# Patient Record
Sex: MALE | Race: WHITE | Employment: FULL TIME | URBAN - METROPOLITAN AREA
[De-identification: names, ages, dates, MRNs, and addresses within clinical notes are randomized per-mention and may not be internally consistent; named-entity substitution may affect disease eponyms.]

---

## 2018-06-11 ENCOUNTER — HOSPITAL ENCOUNTER (EMERGENCY)
Age: 43
Discharge: HOME | End: 2018-06-11
Attending: FAMILY MEDICINE
Payer: COMMERCIAL

## 2018-06-11 VITALS
BODY MASS INDEX: 28.14 KG/M2 | OXYGEN SATURATION: 100 % | TEMPERATURE: 98.9 F | SYSTOLIC BLOOD PRESSURE: 146 MMHG | WEIGHT: 190 LBS | HEART RATE: 84 BPM | RESPIRATION RATE: 14 BRPM | HEIGHT: 69 IN | DIASTOLIC BLOOD PRESSURE: 86 MMHG

## 2018-06-11 DIAGNOSIS — L23.7 POISON IVY DERMATITIS: Primary | ICD-10-CM

## 2018-06-11 PROCEDURE — 99282 EMERGENCY DEPT VISIT SF MDM: CPT

## 2018-06-11 PROCEDURE — 6360000002 HC RX W HCPCS

## 2018-06-11 RX ORDER — METHYLPREDNISOLONE 4 MG/1
TABLET ORAL
Qty: 1 KIT | Refills: 0 | Status: SHIPPED | OUTPATIENT
Start: 2018-06-11 | End: 2018-06-17

## 2018-06-11 RX ORDER — METHYLPREDNISOLONE SODIUM SUCCINATE 40 MG/ML
INJECTION, POWDER, LYOPHILIZED, FOR SOLUTION INTRAMUSCULAR; INTRAVENOUS
Status: COMPLETED
Start: 2018-06-11 | End: 2018-06-11

## 2018-06-11 RX ORDER — METHYLPREDNISOLONE ACETATE 80 MG/ML
80 INJECTION, SUSPENSION INTRA-ARTICULAR; INTRALESIONAL; INTRAMUSCULAR; SOFT TISSUE ONCE
Status: DISCONTINUED | OUTPATIENT
Start: 2018-06-11 | End: 2018-06-11 | Stop reason: HOSPADM

## 2018-06-11 RX ORDER — CLOBETASOL PROPIONATE 0.5 MG/G
OINTMENT TOPICAL
Qty: 30 G | Refills: 0 | Status: SHIPPED | OUTPATIENT
Start: 2018-06-11

## 2018-06-11 RX ADMIN — METHYLPREDNISOLONE SODIUM SUCCINATE 80 MG: 40 INJECTION, POWDER, FOR SOLUTION INTRAMUSCULAR; INTRAVENOUS at 15:13

## 2018-06-11 ASSESSMENT — PAIN DESCRIPTION - LOCATION: LOCATION: ARM

## 2018-06-11 ASSESSMENT — ENCOUNTER SYMPTOMS
ABDOMINAL PAIN: 0
THROAT SWELLING: 0
PERI-ORBITAL EDEMA: 0
WHEEZING: 0
HOARSE VOICE: 0
NAUSEA: 0
VOMITING: 0
SORE THROAT: 0
SHORTNESS OF BREATH: 0
DIARRHEA: 0

## 2018-06-11 ASSESSMENT — PAIN DESCRIPTION - FREQUENCY: FREQUENCY: CONTINUOUS

## 2018-06-11 ASSESSMENT — PAIN DESCRIPTION - ORIENTATION: ORIENTATION: LEFT

## 2018-06-11 ASSESSMENT — PAIN SCALES - GENERAL: PAINLEVEL_OUTOF10: 2

## 2018-06-11 ASSESSMENT — PAIN DESCRIPTION - PAIN TYPE: TYPE: ACUTE PAIN

## 2018-06-11 ASSESSMENT — PAIN DESCRIPTION - DESCRIPTORS: DESCRIPTORS: DISCOMFORT

## 2023-01-07 ENCOUNTER — APPOINTMENT (OUTPATIENT)
Dept: GENERAL RADIOLOGY | Age: 48
End: 2023-01-07
Payer: COMMERCIAL

## 2023-01-07 ENCOUNTER — HOSPITAL ENCOUNTER (INPATIENT)
Age: 48
LOS: 7 days | Discharge: HOME | End: 2023-01-14
Attending: STUDENT IN AN ORGANIZED HEALTH CARE EDUCATION/TRAINING PROGRAM | Admitting: INTERNAL MEDICINE
Payer: COMMERCIAL

## 2023-01-07 ENCOUNTER — APPOINTMENT (OUTPATIENT)
Dept: CT IMAGING | Age: 48
End: 2023-01-07
Payer: COMMERCIAL

## 2023-01-07 DIAGNOSIS — J90 PLEURAL EFFUSION ON LEFT: ICD-10-CM

## 2023-01-07 DIAGNOSIS — G89.18 ACUTE POST-OPERATIVE PAIN: ICD-10-CM

## 2023-01-07 DIAGNOSIS — J86.9 EMPYEMA (HCC): ICD-10-CM

## 2023-01-07 DIAGNOSIS — R07.9 ACUTE CHEST PAIN: Primary | ICD-10-CM

## 2023-01-07 LAB
ALBUMIN SERPL-MCNC: 3.8 G/DL (ref 3.5–5.2)
ALP BLD-CCNC: 96 U/L (ref 40–129)
ALT SERPL-CCNC: 13 U/L (ref 0–40)
ANION GAP SERPL CALCULATED.3IONS-SCNC: 14 MMOL/L (ref 7–16)
AST SERPL-CCNC: 10 U/L (ref 0–39)
BASOPHILS ABSOLUTE: 0.06 E9/L (ref 0–0.2)
BASOPHILS RELATIVE PERCENT: 0.5 % (ref 0–2)
BILIRUB SERPL-MCNC: 0.6 MG/DL (ref 0–1.2)
BUN BLDV-MCNC: 16 MG/DL (ref 6–20)
CALCIUM SERPL-MCNC: 9.2 MG/DL (ref 8.6–10.2)
CHLORIDE BLD-SCNC: 103 MMOL/L (ref 98–107)
CO2: 22 MMOL/L (ref 22–29)
CREAT SERPL-MCNC: 0.8 MG/DL (ref 0.7–1.2)
EOSINOPHILS ABSOLUTE: 0.07 E9/L (ref 0.05–0.5)
EOSINOPHILS RELATIVE PERCENT: 0.6 % (ref 0–6)
GFR SERPL CREATININE-BSD FRML MDRD: >60 ML/MIN/1.73
GLUCOSE BLD-MCNC: 98 MG/DL (ref 74–99)
HCT VFR BLD CALC: 39 % (ref 37–54)
HEMOGLOBIN: 13.2 G/DL (ref 12.5–16.5)
IMMATURE GRANULOCYTES #: 0.04 E9/L
IMMATURE GRANULOCYTES %: 0.4 % (ref 0–5)
INFLUENZA A: NOT DETECTED
INFLUENZA B: NOT DETECTED
LYMPHOCYTES ABSOLUTE: 0.69 E9/L (ref 1.5–4)
LYMPHOCYTES RELATIVE PERCENT: 6.1 % (ref 20–42)
MCH RBC QN AUTO: 28.6 PG (ref 26–35)
MCHC RBC AUTO-ENTMCNC: 33.8 % (ref 32–34.5)
MCV RBC AUTO: 84.4 FL (ref 80–99.9)
MONOCYTES ABSOLUTE: 0.92 E9/L (ref 0.1–0.95)
MONOCYTES RELATIVE PERCENT: 8.2 % (ref 2–12)
NEUTROPHILS ABSOLUTE: 9.45 E9/L (ref 1.8–7.3)
NEUTROPHILS RELATIVE PERCENT: 84.2 % (ref 43–80)
PDW BLD-RTO: 12.6 FL (ref 11.5–15)
PLATELET # BLD: 471 E9/L (ref 130–450)
PMV BLD AUTO: 10.2 FL (ref 7–12)
POTASSIUM REFLEX MAGNESIUM: 4.3 MMOL/L (ref 3.5–5)
PRO-BNP: 15 PG/ML (ref 0–125)
PRO-BNP: 24 PG/ML (ref 0–125)
RBC # BLD: 4.62 E12/L (ref 3.8–5.8)
REASON FOR REJECTION: NORMAL
REASON FOR REJECTION: NORMAL
REJECTED TEST: NORMAL
REJECTED TEST: NORMAL
SARS-COV-2 RNA, RT PCR: NOT DETECTED
SODIUM BLD-SCNC: 139 MMOL/L (ref 132–146)
TOTAL PROTEIN: 7.5 G/DL (ref 6.4–8.3)
TROPONIN, HIGH SENSITIVITY: <6 NG/L (ref 0–11)
TROPONIN, HIGH SENSITIVITY: <6 NG/L (ref 0–11)
WBC # BLD: 11.2 E9/L (ref 4.5–11.5)

## 2023-01-07 PROCEDURE — 85025 COMPLETE CBC W/AUTO DIFF WBC: CPT

## 2023-01-07 PROCEDURE — 71275 CT ANGIOGRAPHY CHEST: CPT

## 2023-01-07 PROCEDURE — 2580000003 HC RX 258: Performed by: STUDENT IN AN ORGANIZED HEALTH CARE EDUCATION/TRAINING PROGRAM

## 2023-01-07 PROCEDURE — 71045 X-RAY EXAM CHEST 1 VIEW: CPT

## 2023-01-07 PROCEDURE — 36415 COLL VENOUS BLD VENIPUNCTURE: CPT

## 2023-01-07 PROCEDURE — 80053 COMPREHEN METABOLIC PANEL: CPT

## 2023-01-07 PROCEDURE — 84484 ASSAY OF TROPONIN QUANT: CPT

## 2023-01-07 PROCEDURE — 6360000004 HC RX CONTRAST MEDICATION: Performed by: RADIOLOGY

## 2023-01-07 PROCEDURE — 96361 HYDRATE IV INFUSION ADD-ON: CPT

## 2023-01-07 PROCEDURE — 83880 ASSAY OF NATRIURETIC PEPTIDE: CPT

## 2023-01-07 PROCEDURE — 96360 HYDRATION IV INFUSION INIT: CPT

## 2023-01-07 PROCEDURE — 99285 EMERGENCY DEPT VISIT HI MDM: CPT

## 2023-01-07 PROCEDURE — 6370000000 HC RX 637 (ALT 250 FOR IP): Performed by: STUDENT IN AN ORGANIZED HEALTH CARE EDUCATION/TRAINING PROGRAM

## 2023-01-07 PROCEDURE — 93005 ELECTROCARDIOGRAM TRACING: CPT | Performed by: STUDENT IN AN ORGANIZED HEALTH CARE EDUCATION/TRAINING PROGRAM

## 2023-01-07 PROCEDURE — 1200000000 HC SEMI PRIVATE

## 2023-01-07 PROCEDURE — 87636 SARSCOV2 & INF A&B AMP PRB: CPT

## 2023-01-07 RX ORDER — ACETAMINOPHEN 325 MG/1
650 TABLET ORAL EVERY 6 HOURS PRN
Status: DISCONTINUED | OUTPATIENT
Start: 2023-01-07 | End: 2023-01-10

## 2023-01-07 RX ORDER — ONDANSETRON 2 MG/ML
4 INJECTION INTRAMUSCULAR; INTRAVENOUS EVERY 6 HOURS PRN
Status: DISCONTINUED | OUTPATIENT
Start: 2023-01-07 | End: 2023-01-10

## 2023-01-07 RX ORDER — BUPROPION HYDROCHLORIDE 100 MG/1
200 TABLET ORAL EVERY MORNING
Status: DISCONTINUED | OUTPATIENT
Start: 2023-01-08 | End: 2023-01-09

## 2023-01-07 RX ORDER — ONDANSETRON 4 MG/1
4 TABLET, ORALLY DISINTEGRATING ORAL EVERY 8 HOURS PRN
Status: DISCONTINUED | OUTPATIENT
Start: 2023-01-07 | End: 2023-01-10

## 2023-01-07 RX ORDER — ENOXAPARIN SODIUM 100 MG/ML
40 INJECTION SUBCUTANEOUS DAILY
Status: DISCONTINUED | OUTPATIENT
Start: 2023-01-08 | End: 2023-01-10

## 2023-01-07 RX ORDER — SODIUM CHLORIDE 9 MG/ML
INJECTION, SOLUTION INTRAVENOUS PRN
Status: DISCONTINUED | OUTPATIENT
Start: 2023-01-07 | End: 2023-01-10

## 2023-01-07 RX ORDER — 0.9 % SODIUM CHLORIDE 0.9 %
1000 INTRAVENOUS SOLUTION INTRAVENOUS ONCE
Status: COMPLETED | OUTPATIENT
Start: 2023-01-07 | End: 2023-01-07

## 2023-01-07 RX ORDER — ACETAMINOPHEN 650 MG/1
650 SUPPOSITORY RECTAL EVERY 6 HOURS PRN
Status: DISCONTINUED | OUTPATIENT
Start: 2023-01-07 | End: 2023-01-10

## 2023-01-07 RX ORDER — SODIUM CHLORIDE 0.9 % (FLUSH) 0.9 %
10 SYRINGE (ML) INJECTION PRN
Status: DISCONTINUED | OUTPATIENT
Start: 2023-01-07 | End: 2023-01-10

## 2023-01-07 RX ORDER — OXYCODONE HYDROCHLORIDE 5 MG/1
5 TABLET ORAL EVERY 4 HOURS PRN
Status: DISCONTINUED | OUTPATIENT
Start: 2023-01-07 | End: 2023-01-09

## 2023-01-07 RX ORDER — OXYCODONE HYDROCHLORIDE AND ACETAMINOPHEN 5; 325 MG/1; MG/1
1 TABLET ORAL ONCE
Status: COMPLETED | OUTPATIENT
Start: 2023-01-07 | End: 2023-01-07

## 2023-01-07 RX ORDER — QUETIAPINE FUMARATE 25 MG/1
100 TABLET, FILM COATED ORAL EVERY EVENING
Status: DISCONTINUED | OUTPATIENT
Start: 2023-01-07 | End: 2023-01-14 | Stop reason: HOSPADM

## 2023-01-07 RX ORDER — SODIUM CHLORIDE 0.9 % (FLUSH) 0.9 %
10 SYRINGE (ML) INJECTION EVERY 12 HOURS SCHEDULED
Status: DISCONTINUED | OUTPATIENT
Start: 2023-01-07 | End: 2023-01-10

## 2023-01-07 RX ADMIN — OXYCODONE AND ACETAMINOPHEN 1 TABLET: 5; 325 TABLET ORAL at 15:59

## 2023-01-07 RX ADMIN — IOPAMIDOL 75 ML: 755 INJECTION, SOLUTION INTRAVENOUS at 14:00

## 2023-01-07 RX ADMIN — SODIUM CHLORIDE 1000 ML: 9 INJECTION, SOLUTION INTRAVENOUS at 10:44

## 2023-01-07 ASSESSMENT — PAIN SCALES - GENERAL
PAINLEVEL_OUTOF10: 10
PAINLEVEL_OUTOF10: 10

## 2023-01-07 ASSESSMENT — PAIN DESCRIPTION - PAIN TYPE: TYPE: ACUTE PAIN

## 2023-01-07 ASSESSMENT — PAIN DESCRIPTION - LOCATION: LOCATION: CHEST

## 2023-01-07 ASSESSMENT — LIFESTYLE VARIABLES
HOW MANY STANDARD DRINKS CONTAINING ALCOHOL DO YOU HAVE ON A TYPICAL DAY: PATIENT DOES NOT DRINK
HOW OFTEN DO YOU HAVE A DRINK CONTAINING ALCOHOL: NEVER

## 2023-01-07 ASSESSMENT — PAIN DESCRIPTION - FREQUENCY: FREQUENCY: CONTINUOUS

## 2023-01-07 ASSESSMENT — PAIN DESCRIPTION - ORIENTATION: ORIENTATION: LEFT

## 2023-01-07 ASSESSMENT — PAIN - FUNCTIONAL ASSESSMENT: PAIN_FUNCTIONAL_ASSESSMENT: 0-10

## 2023-01-07 ASSESSMENT — PAIN DESCRIPTION - ONSET: ONSET: ON-GOING

## 2023-01-07 ASSESSMENT — PAIN DESCRIPTION - DESCRIPTORS: DESCRIPTORS: STABBING

## 2023-01-07 NOTE — ED PROVIDER NOTES
[address] ENCOUNTER        Pt Name: Lucy Monroe  MRN: [id number]  [place] 1975  Date of evaluation: 1/7/2023  Provider: Dottie Mckeon DO  PCP: None  Note Started: 10:29 AM EST 1/7/23    CHIEF COMPLAINT       Chief Complaint   Patient presents with    Chest Pain     ONGOING SICK X1 MONTH, ONGOING BACK PAIN AND YESTERDAY EVERYTIME TAKES DEEP BREATH GETS PAIN ON LEFT LUNG       HISTORY OF PRESENT ILLNESS: 1 or more Elements   History From: Patient    Limitations to history : None    Lucy Monroe is a 50 y.o. male past medical history of bipolar. Patient presents with chief complaint of cough, congestion as well as chest pain. Patient states that symptoms initially began 1 month ago. Patient notes that he has had intermittent episodes of cough congestion as well as low-grade fevers. Patient states that cough is productive of clear sputum. Patient also endorses that over the last few days he has had left-sided chest pain. He describes the pain as a sharp aching sensation currently rates pain 10 out of 10. States that pain is worsened with deep breathing he denies any relieving factors. Patient denies any similar episodes in the past.  Patient denies any nausea, vomit, abdominal pain, constipation, diarrhea, headache, lightheadedness, numbness or tingling. Nursing Notes were all reviewed and agreed with or any disagreements were addressed in the HPI. REVIEW OF SYSTEMS :      Positives and Pertinent negatives as per HPI. SURGICAL HISTORY     Past Surgical History:   Procedure Laterality Date    SHOULDER SURGERY         CURRENTMEDICATIONS       Previous Medications    BUPROPION (WELLBUTRIN) 100 MG TABLET    Take 200 mg by mouth every morning. CLOBETASOL (TEMOVATE) 0.05 % OINTMENT    Apply topically 2 times daily. QUETIAPINE (SEROQUEL) 100 MG TABLET    Take 100 mg by mouth every evening.        ALLERGIES     Sulfa antibiotics    FAMILYHISTORY     History reviewed. No pertinent family history. SOCIAL HISTORY       Social History     Tobacco Use    Smoking status: Never   Substance Use Topics    Alcohol use: No    Drug use: No       SCREENINGS        Damaris Coma Scale  Eye Opening: Spontaneous  Best Verbal Response: Oriented  Best Motor Response: Obeys commands  [place] Coma Scale Score: 15                CIWA Assessment  BP: (!) 144/87  Heart Rate: 97           PHYSICAL EXAM  1 or more Elements     ED Triage Vitals   BP Temp Temp src Heart Rate Resp SpO2 Height Weight   01/07/23 1009 01/07/23 1009 -- 01/07/23 1009 01/07/23 1009 01/07/23 1009 -- 01/07/23 1013   (!) 147/99 98.6 °F (37 °C)  (!) 110 16 95 %  197 lb (89.4 kg)         Constitutional/General: Alert and oriented x3  Head: Normocephalic and atraumatic  Eyes: PERRL, EOMI, conjunctiva normal, sclera non icteric  ENT:  Oropharynx clear, handling secretions, no trismus, no asymmetry of the posterior oropharynx or uvular edema  Neck: Supple, full ROM, no stridor, no meningeal signs  Respiratory: Lungs with coarse breath sounds bilaterally, no wheezes, decreased breath sounds to left lower lobe  Cardiovascular: Tachycardic. Regular rhythm. No murmurs, no gallops, no rubs. 2+ distal pulses. Equal extremity pulses. Chest: No chest wall tenderness  GI:  Abdomen Soft, Non tender, Non distended. +BS. No rebound, guarding, or rigidity. No pulsatile masses. Musculoskeletal: Moves all extremities x 4. Warm and well perfused, no clubbing, no cyanosis, no edema. Capillary refill <3 seconds  Integument: skin warm and dry. No rashes.    Neurologic: GCS 15, no focal deficits, symmetric strength 5/5 in the upper and lower extremities bilaterally  Psychiatric: Normal Affect            DIAGNOSTIC RESULTS   LABS:    Labs Reviewed   CBC WITH AUTO DIFFERENTIAL - Abnormal; Notable for the following components:       Result Value    Platelets 221 (*)     Neutrophils % 84.2 (*) Lymphocytes % 6.1 (*)     Neutrophils Absolute 9.45 (*)     Lymphocytes Absolute 0.69 (*)     All other components within normal limits   COVID-19 & INFLUENZA COMBO   BRAIN NATRIURETIC PEPTIDE    Narrative:     CALL  Kirby Hubbard [phone],  Rejected Test Name/Called to: cmpx trp5 Shavonne Lynch RN, 01/07/2023 11:15, [facility]  [facility]   SPECIMEN REJECTION    Narrative:     Eden Blank tel. [phone],  Rejected Test Name/Called to: cmpx trp5 Shavonne Lynch RN, 01/07/2023 11:15, by  [facility]   COMPREHENSIVE METABOLIC PANEL W/ REFLEX TO MG FOR LOW K   [facility]    Narrative:     Eden Parson tel. [phone],  Rejected Test Name/Called to: YENI Serrano, 01/07/2023 14:46, by DESTINY   TROPONIN       As interpreted by me, the above displayed labs are abnormal. All other labs obtained during this visit were within normal range or not returned as of this dictation. EKG Interpretation  Interpreted by emergency department physician, Esteban Sanchez, DO  EKG #1:  Interpreted by emergency department physician unless otherwise noted. Time:  1023    Rate: 107  Rhythm: Sinus. Interpretation: EKG obtained demonstrates sinus tachycardia, rate 107, normal axis, , no acute ST segment changes noted. .  Comparison: no previous EKG. RADIOLOGY:   Non-plain film images such as CT, Ultrasound and MRI are read by the radiologist. Plain radiographic images are visualized and preliminarily interpreted by the ED Provider with the below findings:    Chest x-ray: Left-sided pleural effusion    Interpretation per the Radiologist below, if available at the time of this note:    CTA PULMONARY W CONTRAST   Final Result   1. No indication for acute pulmonary emboli. 2.  No aneurysm formation or dissection of the thoracic aorta. 3.  Moderate to large size left-sided pleural effusion causes significant   compression atelectasis of the left lower lobe with patent central airways. RECOMMENDATIONS:   Unavailable         XR CHEST PORTABLE   Final Result   Patchy infiltrates/atelectasis and pleural effusion in the left lung base   concerning for pneumonia. Underlying malignancy has to be excluded and close   surveillance recommended to see complete resolution. No results found. No results found. PROCEDURES   Unless otherwise noted below, none         PAST MEDICAL HISTORY/Chronic Conditions Affecting Care      has a past medical history of Bipolar 1 disorder ([facility] [address] EMERGENCY DEPARTMENT COURSE    Vitals:    Vitals:    01/07/23 1013 01/07/23 1050 01/07/23 1424 01/07/23 1426   BP:  124/78  (!) 144/87   Pulse:  96 97 97   Resp:  14  14   Temp:       SpO2:  95%  97%   Weight: 197 lb (89.4 kg)          Patient was given the following medications:  Medications   0.9 % sodium chloride bolus (1,000 mLs IntraVENous New Bag 1/7/23 1044)   iopamidol (ISOVUE-370) 76 % injection 75 mL (75 mLs IntraVENous Given 1/7/23 1400)   oxyCODONE-acetaminophen (PERCOCET) 5-325 MG per tablet 1 tablet (1 tablet Oral Given 1/7/23 1559)           Is this patient to be included in the SEP-1 Core Measure due to severe sepsis or septic shock? No Exclusion criteria - the patient is NOT to be included for SEP-1 Core Measure due to:  Infection is not suspected      Medical Decision Making/Differential Diagnosis:    CC/HPI Summary, Social Determinants of health, Records Reviewed, DDx, testing done/not done, ED Course, Reassessment, disposition considerations/shared decision making with patient, consults, disposition:            History from : Patient    Limitations to history : None    Chronic Conditions: Bipolar type I    CONSULTS: (Who and What was discussed)  None    Discussion with Other Profesionals : Admitting Team Alfredito Perez    Social Determinants : Patient does not have primary care doctor for outpatient follow-up    Records Reviewed : None    CC/HPI Summary, DDx, ED Course, and Reassessment:   Patient is a 78-year-old male past medical history of bipolar. Patient presents with chief complaint of cough, congestion as well as chest pain. Vital signs stable presentation except for mild tachycardia. Physical exam heart mildly tachycardic regular rhythm, lungs with decreased breath sounds to left lower lobe, abdomen soft nontender no rigidity rebound or guarding. Differential diagnosis includes ACS, community-acquired pneumonia, PE, heart failure, viral illness. EKG obtained demonstrated no acute ischemic changes. Laboratory work obtained CBC demonstrated thrombocytosis platelet count 154, CMP demonstrated no acute abnormalities, troponin less than 6x2, proBNP 15, COVID-negative, flu negative. Chest x-ray obtained demonstrated left-sided pleural effusion. Due to tachycardia as well as left-sided pleural effusion CTA of the chest was obtained no evidence of PE however moderate to large left-sided pleural effusion noted. On repeat evaluation patient continues to have chest pain shortness of breath. Shared decision making discussed. Due to patient not having reliable PCP follow-up with undetermined etiology of left-sided pleural effusion decision was made to admit patient for further evaluation. Case discussed with internal medicine at [facility] who agreed to admit patient. Plan of care discussed with patient including admission, all questions were answered, patient was in agreement plan of care and admitted to [facility] in stable condition. Disposition Considerations (Tests not ordered but considered, Shared Decision Making, Pt Expectation of Test or Tx.): Due to abnormal chest x-ray CTA was performed demonstrated moderate to large left-sided pleural effusion. Shared decision making discussed with patient he has not seen Dr. John Branch in 5 to 6 years and does not have close outpatient follow-up.   Given no definitive etiology of pleural effusion at this time decision was made to admit patient for further evaluation      FINAL IMPRESSION      1. Acute chest pain    2. Pleural effusion on left          DISPOSITION/PLAN     DISPOSITION Decision To Admit 01/07/2023 04:12:52 PM      PATIENT REFERRED TO:  No follow-up provider specified.     DISCHARGE MEDICATIONS:  New Prescriptions    No medications on file       DISCONTINUED MEDICATIONS:  Discontinued Medications    No medications on file              (Please note that portions of this note were completed with a voice recognition program.  Efforts were made to edit the dictations but occasionally words are mis-transcribed.)    Alberto Sheldon DO (electronically signed)           Panda Sanchez DO  Resident  01/07/23 2336

## 2023-01-07 NOTE — ED NOTES
ETA PAS 2130.  Spoke with [address] [facility]     Sgiifredo Jesus RN  01/07/23 3599

## 2023-01-08 ENCOUNTER — APPOINTMENT (OUTPATIENT)
Dept: GENERAL RADIOLOGY | Age: 48
End: 2023-01-08
Payer: COMMERCIAL

## 2023-01-08 LAB
ALBUMIN SERPL-MCNC: 3.4 G/DL (ref 3.5–5.2)
ALP BLD-CCNC: 117 U/L (ref 40–129)
ALT SERPL-CCNC: 19 U/L (ref 0–40)
ANION GAP SERPL CALCULATED.3IONS-SCNC: 15 MMOL/L (ref 7–16)
ANISOCYTOSIS: ABNORMAL
AST SERPL-CCNC: 14 U/L (ref 0–39)
BASOPHILS ABSOLUTE: 0.04 E9/L (ref 0–0.2)
BASOPHILS RELATIVE PERCENT: 0.3 % (ref 0–2)
BILIRUB SERPL-MCNC: 0.8 MG/DL (ref 0–1.2)
BUN BLDV-MCNC: 14 MG/DL (ref 6–20)
BURR CELLS: ABNORMAL
CALCIUM SERPL-MCNC: 9.2 MG/DL (ref 8.6–10.2)
CHLORIDE BLD-SCNC: 101 MMOL/L (ref 98–107)
CO2: 20 MMOL/L (ref 22–29)
CREAT SERPL-MCNC: 0.9 MG/DL (ref 0.7–1.2)
CRITICAL: NORMAL
DATE ANALYZED: NORMAL
DATE OF COLLECTION: NORMAL
EOSINOPHILS ABSOLUTE: 0.12 E9/L (ref 0.05–0.5)
EOSINOPHILS RELATIVE PERCENT: 0.9 % (ref 0–6)
FLUID TYPE: NORMAL
GFR SERPL CREATININE-BSD FRML MDRD: >60 ML/MIN/1.73
GLUCOSE BLD-MCNC: 119 MG/DL (ref 74–99)
GLUCOSE, FLUID: 91 MG/DL
HCT VFR BLD CALC: 36.6 % (ref 37–54)
HEMOGLOBIN: 12.4 G/DL (ref 12.5–16.5)
IMMATURE GRANULOCYTES #: 0.06 E9/L
IMMATURE GRANULOCYTES %: 0.5 % (ref 0–5)
LAB: NORMAL
LD, FLUID: 448 U/L
LYMPHOCYTES ABSOLUTE: 0.88 E9/L (ref 1.5–4)
LYMPHOCYTES RELATIVE PERCENT: 6.7 % (ref 20–42)
Lab: NORMAL
MCH RBC QN AUTO: 28.1 PG (ref 26–35)
MCHC RBC AUTO-ENTMCNC: 33.9 % (ref 32–34.5)
MCV RBC AUTO: 83 FL (ref 80–99.9)
MONOCYTES ABSOLUTE: 1.55 E9/L (ref 0.1–0.95)
MONOCYTES RELATIVE PERCENT: 11.9 % (ref 2–12)
NEUTROPHILS ABSOLUTE: 10.4 E9/L (ref 1.8–7.3)
NEUTROPHILS RELATIVE PERCENT: 79.7 % (ref 43–80)
OPERATOR ID: NORMAL
OVALOCYTES: ABNORMAL
PDW BLD-RTO: 12.9 FL (ref 11.5–15)
PH FLUID: 7.27
PLATELET # BLD: 357 E9/L (ref 130–450)
PMV BLD AUTO: 9.6 FL (ref 7–12)
POIKILOCYTES: ABNORMAL
POLYCHROMASIA: ABNORMAL
POTASSIUM REFLEX MAGNESIUM: 4.1 MMOL/L (ref 3.5–5)
PROTEIN FLUID: 5.4 G/DL
RBC # BLD: 4.41 E12/L (ref 3.8–5.8)
SODIUM BLD-SCNC: 136 MMOL/L (ref 132–146)
SOURCE, BLOOD GAS: NORMAL
TIME ANALYZED: 911
TOTAL PROTEIN: 7.5 G/DL (ref 6.4–8.3)
TROPONIN, HIGH SENSITIVITY: 11 NG/L (ref 0–11)
TROPONIN, HIGH SENSITIVITY: 12 NG/L (ref 0–11)
WBC # BLD: 13.1 E9/L (ref 4.5–11.5)

## 2023-01-08 PROCEDURE — 87116 MYCOBACTERIA CULTURE: CPT

## 2023-01-08 PROCEDURE — 88305 TISSUE EXAM BY PATHOLOGIST: CPT

## 2023-01-08 PROCEDURE — 83986 ASSAY PH BODY FLUID NOS: CPT

## 2023-01-08 PROCEDURE — 6360000002 HC RX W HCPCS: Performed by: INTERNAL MEDICINE

## 2023-01-08 PROCEDURE — 87070 CULTURE OTHR SPECIMN AEROBIC: CPT

## 2023-01-08 PROCEDURE — 2580000003 HC RX 258: Performed by: INTERNAL MEDICINE

## 2023-01-08 PROCEDURE — 1200000000 HC SEMI PRIVATE

## 2023-01-08 PROCEDURE — 6370000000 HC RX 637 (ALT 250 FOR IP): Performed by: INTERNAL MEDICINE

## 2023-01-08 PROCEDURE — 82947 ASSAY GLUCOSE BLOOD QUANT: CPT

## 2023-01-08 PROCEDURE — 87205 SMEAR GRAM STAIN: CPT

## 2023-01-08 PROCEDURE — 85025 COMPLETE CBC W/AUTO DIFF WBC: CPT

## 2023-01-08 PROCEDURE — 84484 ASSAY OF TROPONIN QUANT: CPT

## 2023-01-08 PROCEDURE — 87102 FUNGUS ISOLATION CULTURE: CPT

## 2023-01-08 PROCEDURE — 36415 COLL VENOUS BLD VENIPUNCTURE: CPT

## 2023-01-08 PROCEDURE — 87206 SMEAR FLUORESCENT/ACID STAI: CPT

## 2023-01-08 PROCEDURE — 6370000000 HC RX 637 (ALT 250 FOR IP): Performed by: NURSE PRACTITIONER

## 2023-01-08 PROCEDURE — 84157 ASSAY OF PROTEIN OTHER: CPT

## 2023-01-08 PROCEDURE — 89051 BODY FLUID CELL COUNT: CPT

## 2023-01-08 PROCEDURE — 93005 ELECTROCARDIOGRAM TRACING: CPT | Performed by: INTERNAL MEDICINE

## 2023-01-08 PROCEDURE — 83615 LACTATE (LD) (LDH) ENZYME: CPT

## 2023-01-08 PROCEDURE — 87210 SMEAR WET MOUNT SALINE/INK: CPT

## 2023-01-08 PROCEDURE — 80053 COMPREHEN METABOLIC PANEL: CPT

## 2023-01-08 PROCEDURE — 6370000000 HC RX 637 (ALT 250 FOR IP): Performed by: FAMILY MEDICINE

## 2023-01-08 PROCEDURE — 88112 CYTOPATH CELL ENHANCE TECH: CPT

## 2023-01-08 PROCEDURE — 71045 X-RAY EXAM CHEST 1 VIEW: CPT

## 2023-01-08 PROCEDURE — 6360000002 HC RX W HCPCS: Performed by: NURSE PRACTITIONER

## 2023-01-08 PROCEDURE — 0W9B3ZZ DRAINAGE OF LEFT PLEURAL CAVITY, PERCUTANEOUS APPROACH: ICD-10-PCS | Performed by: INTERNAL MEDICINE

## 2023-01-08 RX ORDER — DIMETHICONE, OXYBENZONE, AND PADIMATE O 2; 2.5; 6.6 G/100G; G/100G; G/100G
STICK TOPICAL PRN
Status: DISCONTINUED | OUTPATIENT
Start: 2023-01-08 | End: 2023-01-14 | Stop reason: HOSPADM

## 2023-01-08 RX ORDER — LIDOCAINE HYDROCHLORIDE 10 MG/ML
INJECTION, SOLUTION INFILTRATION; PERINEURAL
Status: DISPENSED
Start: 2023-01-08 | End: 2023-01-08

## 2023-01-08 RX ORDER — FUROSEMIDE 20 MG/1
20 TABLET ORAL DAILY
Status: DISCONTINUED | OUTPATIENT
Start: 2023-01-08 | End: 2023-01-10

## 2023-01-08 RX ORDER — FENTANYL CITRATE 50 UG/ML
25 INJECTION, SOLUTION INTRAMUSCULAR; INTRAVENOUS ONCE
Status: COMPLETED | OUTPATIENT
Start: 2023-01-08 | End: 2023-01-08

## 2023-01-08 RX ADMIN — VANCOMYCIN HYDROCHLORIDE 1000 MG: 1 INJECTION, POWDER, LYOPHILIZED, FOR SOLUTION INTRAVENOUS at 00:28

## 2023-01-08 RX ADMIN — VANCOMYCIN HYDROCHLORIDE 1000 MG: 1 INJECTION, POWDER, LYOPHILIZED, FOR SOLUTION INTRAVENOUS at 11:59

## 2023-01-08 RX ADMIN — ENOXAPARIN SODIUM 40 MG: 100 INJECTION SUBCUTANEOUS at 09:23

## 2023-01-08 RX ADMIN — PIPERACILLIN AND TAZOBACTAM 4500 MG: 4; .5 INJECTION, POWDER, LYOPHILIZED, FOR SOLUTION INTRAVENOUS at 09:23

## 2023-01-08 RX ADMIN — ACETAMINOPHEN 650 MG: 325 TABLET ORAL at 00:26

## 2023-01-08 RX ADMIN — Medication: at 16:22

## 2023-01-08 RX ADMIN — PIPERACILLIN AND TAZOBACTAM 4500 MG: 4; .5 INJECTION, POWDER, LYOPHILIZED, FOR SOLUTION INTRAVENOUS at 16:27

## 2023-01-08 RX ADMIN — OXYCODONE HYDROCHLORIDE 5 MG: 5 TABLET ORAL at 00:28

## 2023-01-08 RX ADMIN — PIPERACILLIN AND TAZOBACTAM 4500 MG: 4; .5 INJECTION, POWDER, LYOPHILIZED, FOR SOLUTION INTRAVENOUS at 01:27

## 2023-01-08 RX ADMIN — FENTANYL CITRATE 25 MCG: 50 INJECTION, SOLUTION INTRAMUSCULAR; INTRAVENOUS at 09:16

## 2023-01-08 RX ADMIN — Medication 10 ML: at 20:33

## 2023-01-08 RX ADMIN — QUETIAPINE FUMARATE 100 MG: 100 TABLET ORAL at 18:40

## 2023-01-08 RX ADMIN — VANCOMYCIN HYDROCHLORIDE 1000 MG: 1 INJECTION, POWDER, LYOPHILIZED, FOR SOLUTION INTRAVENOUS at 23:37

## 2023-01-08 RX ADMIN — OXYCODONE HYDROCHLORIDE 5 MG: 5 TABLET ORAL at 10:46

## 2023-01-08 RX ADMIN — Medication 10 ML: at 09:24

## 2023-01-08 RX ADMIN — OXYCODONE HYDROCHLORIDE 5 MG: 5 TABLET ORAL at 16:23

## 2023-01-08 RX ADMIN — OXYCODONE HYDROCHLORIDE 5 MG: 5 TABLET ORAL at 20:33

## 2023-01-08 RX ADMIN — BUPROPION HYDROCHLORIDE 200 MG: 100 TABLET, FILM COATED ORAL at 09:24

## 2023-01-08 RX ADMIN — Medication 10 ML: at 00:27

## 2023-01-08 RX ADMIN — FUROSEMIDE 20 MG: 20 TABLET ORAL at 12:01

## 2023-01-08 ASSESSMENT — PAIN SCALES - GENERAL
PAINLEVEL_OUTOF10: 9
PAINLEVEL_OUTOF10: 8
PAINLEVEL_OUTOF10: 6
PAINLEVEL_OUTOF10: 4
PAINLEVEL_OUTOF10: 3
PAINLEVEL_OUTOF10: 4
PAINLEVEL_OUTOF10: 4
PAINLEVEL_OUTOF10: 8
PAINLEVEL_OUTOF10: 4
PAINLEVEL_OUTOF10: 4
PAINLEVEL_OUTOF10: 7
PAINLEVEL_OUTOF10: 5

## 2023-01-08 ASSESSMENT — PAIN DESCRIPTION - ORIENTATION
ORIENTATION: LEFT
ORIENTATION: ANTERIOR
ORIENTATION: ANTERIOR

## 2023-01-08 ASSESSMENT — PAIN DESCRIPTION - LOCATION
LOCATION: CHEST

## 2023-01-08 ASSESSMENT — PAIN DESCRIPTION - PAIN TYPE
TYPE: ACUTE PAIN
TYPE: ACUTE PAIN

## 2023-01-08 ASSESSMENT — PAIN - FUNCTIONAL ASSESSMENT
PAIN_FUNCTIONAL_ASSESSMENT: PREVENTS OR INTERFERES SOME ACTIVE ACTIVITIES AND ADLS

## 2023-01-08 ASSESSMENT — PAIN DESCRIPTION - DESCRIPTORS
DESCRIPTORS: ACHING;DISCOMFORT;SORE
DESCRIPTORS: ACHING;BURNING;SORE;DISCOMFORT
DESCRIPTORS: ACHING;PRESSURE;SORE;DISCOMFORT
DESCRIPTORS: ACHING;SHARP;SORE;DISCOMFORT

## 2023-01-08 NOTE — H&P
Hospitalist History & Physical      PATIENT NAME:  Dionicio Hall    MRN:  [id number]  SERVICE DATE:  01/07/23    Primary Care Physician: No primary care provider on file. SUBJECTIVE  CHIEF COMPLAINT:  had concerns including Chest Pain (ONGOING SICK X1 MONTH, ONGOING BACK PAIN AND YESTERDAY EVERYTIME TAKES DEEP BREATH GETS PAIN ON LEFT LUNG). HPI:  Mr. Dionicio Hall, a 50y.o. year old male  who  has a past medical history of Bipolar 1 disorder ([facility] presents with one month of intermittent cough and SOB, associated with left chest pain more upon coughing associated with low grade fever. Has sputum production but clear. Trisha Bombard left chest pain, no relieving factors, denies nausea or vomiting no abdominal pain or diarrhea no lightheadedness or tingling. PAST MEDICAL HISTORY:    Past Medical History:   Diagnosis Date    Bipolar 1 disorder ([facility]      PAST SURGICAL HISTORY:    Past Surgical History:   Procedure Laterality Date    SHOULDER SURGERY       FAMILY HISTORY:  History reviewed. No pertinent family history. SOCIAL HISTORY:    Social History     Socioeconomic History    Marital status:      Spouse name: Not on file    Number of children: Not on file    Years of education: Not on file    Highest education level: Not on file   Occupational History    Not on file   Tobacco Use    Smoking status: Never    Smokeless tobacco: Not on file   Substance and Sexual Activity    Alcohol use: No    Drug use: No    Sexual activity: Not on file   Other Topics Concern    Not on file   Social History Narrative    Not on file     Social Determinants of Health     Financial Resource Strain: Not on file   Food Insecurity: Not on file   Transportation Needs: Not on file   Physical Activity: Not on file   Stress: Not on file   Social Connections: Not on file   Intimate Partner Violence: Not on file   Housing Stability: Not on file    TOBACCO:   reports that he has never smoked.  He does not have any smokeless tobacco history on file. ETOH:   reports no history of alcohol use. MEDICATIONS:   Prior to Admission medications    Medication Sig Start Date End Date Taking? Authorizing Provider   clobetasol (TEMOVATE) 0.05 % ointment Apply topically 2 times daily. 6/11/18   Sancho Ramos,    buPROPion (WELLBUTRIN) 100 MG tablet Take 200 mg by mouth every morning. Historical Provider, MD   QUEtiapine (SEROQUEL) 100 MG tablet Take 100 mg by mouth every evening. Historical Provider, MD        ALLERGIES: Sulfa antibiotics    REVIEW OF SYSTEM:   ROS as noted in HPI, 12 point ROS reviewed and otherwise negative. OBJECTIVE  PHYSICAL EXAM:   Vitals:    01/07/23 1836 01/07/23 1911 01/07/23 1913 01/07/23 2221   BP: (!) 142/88 (!) 155/86 (!) 155/86 (!) 131/96   Pulse:  94 95 (!) 117   Resp: 16  14 20   Temp: 99.5 °F (37.5 °C)   (!) 101.4 °F (38.6 °C)   TempSrc: Oral   Temporal   SpO2: 92% 100% 100% 94%   Weight:           General appearance: alert, appears stated age and cooperative  CONSTITUTIONAL:  no apparent distress  ENT:  normocephalic, without obvious abnormality, atraumatic  NECK:  supple, symmetrical, trachea midline  Heart: regular rate and rhythm, S1, S2 normal   Lungs: coarse bs bibasilar. Abdomen: soft lax, not tender, not distended, positive bowel sounds  Extremities: extremities normal, atraumatic, no cyanosis, edema  Skin: Normal skin color. No rashes or lesions. Neurologic:  Neurovascularly intact without any focal sensory/motor deficits. Cranial nerves: II-XII intact, grossly non-focal.  Psychiatric: Alert and oriented, thought content appropriate, normal insight, affect      DATA:     Diagnostic tests reviewed for today's visit:    Most recent labs and imaging results reviewed.    Labs:   Recent Results (from the past 72 hour(s))   CBC with Auto Differential    Collection Time: 01/07/23 10:50 AM   Result Value Ref Range    WBC 11.2 4.5 - 11.5 E9/L    RBC 4.62 3.80 - 5.80 E12/L    Hemoglobin 13.2 12.5 - 16.5 g/dL    Hematocrit 39.0 37.0 - 54.0 %    MCV 84.4 80.0 - 99.9 fL    MCH 28.6 26.0 - 35.0 pg    MCHC 33.8 32.0 - 34.5 %    RDW 12.6 11.5 - 15.0 fL    Platelets 120 (H) 315 - 450 E9/L    MPV 10.2 7.0 - 12.0 fL    Neutrophils % 84.2 (H) 43.0 - 80.0 %    Immature Granulocytes % 0.4 0.0 - 5.0 %    Lymphocytes % 6.1 (L) 20.0 - 42.0 %    Monocytes % 8.2 2.0 - 12.0 %    Eosinophils % 0.6 0.0 - 6.0 %    Basophils % 0.5 0.0 - 2.0 %    Neutrophils Absolute 9.45 (H) 1.80 - 7.30 E9/L    Immature Granulocytes # 0.04 E9/L    Lymphocytes Absolute 0.69 (L) 1.50 - 4.00 E9/L    Monocytes Absolute 0.92 0.10 - 0.95 E9/L    Eosinophils Absolute 0.07 0.05 - 0.50 E9/L    Basophils Absolute 0.06 0.00 - 0.20 E9/L   Brain Natriuretic Peptide    Collection Time: 01/07/23 10:50 AM   Result Value Ref Range    Pro-BNP 24 0 - 125 pg/mL   COVID-19 & Influenza Combo    Collection Time: 01/07/23 10:51 AM    Specimen: Nasopharyngeal Swab   Result Value Ref Range    SARS-CoV-2 RNA, RT PCR NOT DETECTED NOT DETECTED    INFLUENZA A NOT DETECTED NOT DETECTED    INFLUENZA B NOT DETECTED NOT DETECTED   SPECIMEN REJECTION    Collection Time: 01/07/23 11:14 AM   Result Value Ref Range    Rejected Test cmpx trp5     Reason for Rejection see below    Comprehensive Metabolic Panel w/ Reflex to MG    Collection Time: 01/07/23  1:17 PM   Result Value Ref Range    Sodium 139 132 - 146 mmol/L    Potassium reflex Magnesium 4.3 3.5 - 5.0 mmol/L    Chloride 103 98 - 107 mmol/L    CO2 22 22 - 29 mmol/L    Anion Gap 14 7 - 16 mmol/L    Glucose 98 74 - 99 mg/dL    BUN 16 6 - 20 mg/dL    Creatinine 0.8 0.7 - 1.2 mg/dL    Est, Glom Filt Rate >60 >=60 mL/min/1.73    Calcium 9.2 8.6 - 10.2 mg/dL    Total Protein 7.5 6.4 - 8.3 g/dL    Albumin 3.8 3.5 - 5.2 g/dL    Total Bilirubin 0.6 0.0 - 1.2 mg/dL    Alkaline Phosphatase 96 40 - 129 U/L    ALT 13 0 - 40 U/L    AST 10 0 - 39 U/L   Troponin    Collection Time: 01/07/23  1:17 PM   Result Value Ref Range    Troponin, High Sensitivity <6 0 - 11 ng/L   Brain Natriuretic Peptide    Collection Time: 01/07/23  1:17 PM   Result Value Ref Range    Pro-BNP 15 0 - 125 pg/mL   SPECIMEN REJECTION    Collection Time: 01/07/23  2:47 PM   Result Value Ref Range    Rejected Test TRP     Reason for Rejection see below    Troponin    Collection Time: 01/07/23  3:15 PM   Result Value Ref Range    Troponin, High Sensitivity <6 0 - 11 ng/L     Oupatient labs:  No results found for: CHOL, TRIG, HDL, LDLCALC, TSH, PSA, INR, LABA1C    Urinalysis:  No results found for: NITRU, WBCUA, BACTERIA, RBCUA, BLOODU, SPECGRAV, GLUCOSEU    Imaging:  XR CHEST PORTABLE    Result Date: 1/7/2023  EXAMINATION: ONE XRAY VIEW OF THE CHEST 1/7/2023 11:28 am COMPARISON: August 30, 2010 HISTORY: ORDERING SYSTEM PROVIDED HISTORY: chest pain, SOB TECHNOLOGIST PROVIDED HISTORY: Reason for exam:->chest pain, SOB FINDINGS: There is cardiomegaly. There is patchy infiltrates and pleural effusion in the left lower lobe concerning for pneumonia. The right lung is clear. Patchy infiltrates/atelectasis and pleural effusion in the left lung base concerning for pneumonia. Underlying malignancy has to be excluded and close surveillance recommended to see complete resolution. CTA PULMONARY W CONTRAST    Result Date: 1/7/2023  EXAMINATION: CTA OF THE CHEST 1/7/2023 1:46 pm TECHNIQUE: CTA of the chest was performed after the administration of intravenous contrast.  Multiplanar reformatted images are provided for review. MIP images are provided for review. Automated exposure control, iterative reconstruction, and/or weight based adjustment of the mA/kV was utilized to reduce the radiation dose to as low as reasonably achievable. COMPARISON: None.  HISTORY: ORDERING SYSTEM PROVIDED HISTORY: chest, SOB, R/o PE TECHNOLOGIST PROVIDED HISTORY: Reason for exam:->chest, SOB, R/o PE Decision Support Exception - unselect if not a suspected or confirmed emergency medical condition->Emergency Medical Condition (MA) FINDINGS: Some motion artifacts are seen in the collapsed left lung which causes misregistration of the images in some the segment/subsegmental pulmonary arteries at that level. There is no indication for acute pulmonary embolus in the main PA, right left main PAs in the lobar, segmental and subsegmental branches to the 3/4 order. There is no aneurysm formation dissection of the thoracic aorta. Heart is normal size. LV inner diameter: 4.6 cm.  RV inner diameter: 4.3 cm. Is no pericardial effusion. No mediastinal masses or adenopathy seen. Presence of a moderate to large size left-sided pleural effusion with significant compression atelectasis of the basal segments of the left upper but there are patent the central airways. There is some compression by the pleural effusion of the posterior aspect of the left upper lobe as the fusion extends into the left pleural fissure. Right lungs well expanded. Infiltrates or consolidations seen in the right lung. There is no right-sided pleural effusion. Upper abdominal structures not fully covered. What is visualized appear unremarkable. Degenerative changes relate with spondylosis seen in the thoracic spine. 1.  No indication for acute pulmonary emboli. 2.  No aneurysm formation or dissection of the thoracic aorta. 3.  Moderate to large size left-sided pleural effusion causes significant compression atelectasis of the left lower lobe with patent central airways. RECOMMENDATIONS: Unavailable     CTA PULMONARY W CONTRAST   Final Result   1. No indication for acute pulmonary emboli. 2.  No aneurysm formation or dissection of the thoracic aorta. 3.  Moderate to large size left-sided pleural effusion causes significant   compression atelectasis of the left lower lobe with patent central airways.       RECOMMENDATIONS:   Unavailable         XR CHEST PORTABLE   Final Result   Patchy infiltrates/atelectasis and pleural effusion in the left lung base   concerning for pneumonia. Underlying malignancy has to be excluded and close   surveillance recommended to see complete resolution. ASSESSMENT AND PLAN  Principal Problem:    Pleural effusion on left  Active Problems:    Pleural effusion  Resolved Problems:    * No resolved hospital problems. *    - left pleuritic chest pain  - left pleural effusion   - fever     Plan:  - admit to tele monitoring, directly admitted transfer   - serial troponin and check Echo  - empiric vanc/zosyn  - Consult Pulmonology for further recommendations  - Oxygen supplement as needed   - pain control       VTE Prophylaxis: low molecular weight heparin -    DVT Prophylaxis: [x]Lovenox []Heparin []PCD [] [address] []Encouraged ambulation    Diet: ADULT DIET; Regular  Code Status: Full Code  Surrogate decision maker confirmed with patient:  Primary Emergency Contact: Nika Clark, Home Phone: [phone]      Disposition: []Med/Surg [x] Intermediate [] ICU/CCU   Admit status: [] Observation [x] Inpatient       Additional work up or/and treatment plan may be added today or thereafter based on clinical progression. I am managing a portion of pt care. Some medical issues are handled by other specialists. Additional work up and treatment should be done by my colleague hospitalist and at out pt setting by pt PCP and other out pt providers.      Estrellita Khan MD  DATE: January 7, 2023

## 2023-01-08 NOTE — CONSULTS
Kiah Nolen M.D.,[facility]  Lor Tapia D.O., F.A.C.O.I., Roger Conrad M.D. Evans Silverman M.D. Marciano Thompson D.O. Patient:  Neto Sánchez 50 y.o. male MRN: [id number]     Date of Service: 1/8/2023      PULMONARY CONSULTATION    Reason for Consultation: Pleural effusion  Referring Physician: Dr. Giovanna Buck with the referring physician will be sent via the electronic medical record. Chief Complaint: shortness of breath    CODE STATUS: full code    SUBJECTIVE:  HPI:  Neto Sánchez is a 50 y.o. male not previously known to us with no pulmonary history who presented to the ED on 1/7/2023 with complaints of cough, congestion, and chest pain. His symptoms began approximately 1 month ago while traveling to [place]. Before he left, he had a dental procedure done and reports that the technician had a chronic cough though she wore a mask. While in [place], he was sick with a cough, congestion, runny nose, headache, and neck pain. He denied fever. He then started to feel better and about 1 week later started to feel worse again and this time had a fever, productive cough with tan sputum, and shortness of breath. He presented to the ED after developing associated left-sided chest pain that was worsened with deep breathing. Chest x-ray was completed and it demonstrated patchy infiltrates and atelectasis along with pleural effusion in the left lung base concerning for pneumonia. We were consulted for the effusion and to exclude underlying malignancy. Today, he is seen and examined lying in bed on room air. Dr. Huntley Even performed thoracentesis at bedside today and removed 600 cc of fluid. Fluid was sent for culture and cytology. Follow-up chest x-ray shows some improvement with basilar infiltrate.   We will continue to follow      Past Medical History:   Diagnosis Date    Bipolar 1 disorder [facility])        Past Surgical History:   Procedure Laterality Date    SHOULDER SURGERY History reviewed. No pertinent family history. Social History:   Social History     Socioeconomic History    Marital status:      Spouse name: Not on file    Number of children: Not on file    Years of education: Not on file    Highest education level: Not on file   Occupational History    Not on file   Tobacco Use    Smoking status: Never    Smokeless tobacco: Not on file   Substance and Sexual Activity    Alcohol use: No    Drug use: No    Sexual activity: Not on file   Other Topics Concern    Not on file   Social History Narrative    Not on file     Social Determinants of Health     Financial Resource Strain: Not on file   Food Insecurity: Not on file   Transportation Needs: Not on file   Physical Activity: Not on file   Stress: Not on file   Social Connections: Not on file   Intimate Partner Violence: Not on file   Housing Stability: Not on file     Smoking history: The patient is a Past smoker of 1 pack a day for 15 years pack year equal 15. ETOH:   reports no history of alcohol use. Exposures: There  is not history of TB or TB exposure. There is not asbestos or silica dust exposure. The patient reports does not have coal, foundry, quarry or [address] exposure. Recent travel history none. There is not  history of recreational or IV drug use. There is not hot tub exposure. The patient does not have any exotic pets, turtles or exotic birds. Vaccines:    Influenza:  Refused  Pneumococcal Polysaccharide:  Refused    There is no immunization history on file for this patient. Home Meds: Medications Prior to Admission: clobetasol (TEMOVATE) 0.05 % ointment, Apply topically 2 times daily. buPROPion (WELLBUTRIN) 100 MG tablet, Take 200 mg by mouth every morning. QUEtiapine (SEROQUEL) 100 MG tablet, Take 100 mg by mouth every evening.     CURRENT MEDS :  Scheduled Meds:   lidocaine        furosemide  20 mg Oral Daily    piperacillin-tazobactam  4,500 mg IntraVENous Q8H vancomycin  1,000 mg IntraVENous Q12H    QUEtiapine  100 mg Oral QPM    buPROPion  200 mg Oral QAM    sodium chloride flush  10 mL IntraVENous 2 times per day    enoxaparin  40 mg SubCUTAneous Daily       Continuous Infusions:   sodium chloride         Allergies   Allergen Reactions    Sulfa Antibiotics        REVIEW OF SYSTEMS:  Constitutional: Denies fever, weight loss, night sweats, and fatigue  Skin: Denies pigmentation, dark lesions, and rashes   HEENT: Denies hearing loss, tinnitus, ear drainage, epistaxis, sore throat, and hoarseness. Cardiovascular: Denies palpitations, chest pain, and chest pressure. Respiratory: + cough, denies dyspnea at rest, hemoptysis, apnea, and choking. Gastrointestinal: Denies nausea, vomiting, poor appetite, diarrhea, heartburn or reflux  Genitourinary: Denies dysuria, frequency, urgency or hematuria  Musculoskeletal: Denies myalgias, muscle weakness, and bone pain  Neurological: Denies dizziness, vertigo, headache, and focal weakness  Psychological: Denies anxiety and depression  Endocrine: Denies heat intolerance and cold intolerance  Hematopoietic/Lymphatic: Denies bleeding problems and blood transfusions    OBJECTIVE:   /81   Pulse (!) 103   Temp 98.1 °F (36.7 °C) (Temporal)   Resp 16   Wt 186 lb (84.4 kg)   SpO2 95%   BMI 27.47 kg/m²   SpO2 Readings from Last 1 Encounters:   01/08/23 95%        I/O:  No intake or output data in the 24 hours ending 01/08/23 1138         Physical Exam:  General: The patient is lying in bed comfortably without any distress. Breathing is not labored  HEENT: Pupils are equal round and reactive to light, there are no oral lesions and no post-nasal drip   Neck: supple without adenopathy  Cardiovascular: regular rate and rhythm without murmur or gallop  Respiratory: Clear to auscultation bilaterally without wheezing or crackles.   Air entry is symmetric  Abdomen: soft, non-tender, non-distended, normal bowel sounds  Extremities: warm, no edema, no clubbing  Skin: no rash or lesion  Neurologic: CN II-XII grossly intact, no focal deficits    Pulmonary Function Testing personally reviewed and interpreted  None on file    Imaging personally reviewed:  1/8/2023 CXR post thoracentesis  The cardiac silhouette is probably unchanged in size. The pulmonary   vasculature is within normal limits. There is consolidation and/or collapse   in the left lung base. There is also a left pleural effusion. These   findings have not significantly changed. 1/7/2023 CTA chest  1. No indication for acute pulmonary emboli. 2.  No aneurysm formation or dissection of the thoracic aorta. 3.  Moderate to large size left-sided pleural effusion causes significant   compression atelectasis of the left lower lobe with patent central airways. Echo:  None on file    Labs:  Lab Results   Component Value Date/Time    WBC 13.1 01/08/2023 06:19 AM    HGB 12.4 01/08/2023 06:19 AM    HCT 36.6 01/08/2023 06:19 AM    MCV 83.0 01/08/2023 06:19 AM    MCH 28.1 01/08/2023 06:19 AM    MCHC 33.9 01/08/2023 06:19 AM    RDW 12.9 01/08/2023 06:19 AM     01/08/2023 06:19 AM    MPV 9.6 01/08/2023 06:19 AM     Lab Results   Component Value Date/Time     01/08/2023 06:19 AM    K 4.1 01/08/2023 06:19 AM     01/08/2023 06:19 AM    CO2 20 01/08/2023 06:19 AM    BUN 14 01/08/2023 06:19 AM    CREATININE 0.9 01/08/2023 06:19 AM    LABALBU 3.4 01/08/2023 06:19 AM    CALCIUM 9.2 01/08/2023 06:19 AM    LABGLOM >60 01/08/2023 06:19 AM     No results found for: PROTIME, INR  Recent Labs     01/07/23  1317   PROBNP 15     No results for input(s): TROPONINI in the last 72 hours. No results for input(s): PROCAL in the last 72 hours. This SmartLink has not been configured with any valid records. Micro:  No results for input(s): CULTRESP in the last 72 hours. No results for input(s): LABGRAM in the last 72 hours.   No results for input(s): LEGUR in the last 72 hours. No results for input(s): STREPNEUMAGU in the last 72 hours. No results for input(s): LP1UAG in the last 72 hours. Assessment:  Left-sided pleural effusion most likely parapneumonic  Community-acquired pneumonia  Fever    Plan:  Thoracentesis performed 1/8/2023, 600 cc removed. Fluid sent for culture and analysis and cytology  Oxygen therapy as needed to keep saturations greater than 92%, currently on room air  Empiric Zosyn and vancomycin  Adequate pain control  Agree with diuresis, Lasix 20 mg daily  Repeat chest x-ray reviewed    Thank you for allowing me to participate in the care of Sim Ibarra. Please feel free to call with questions. This plan of care was reviewed in collaboration with Dr. Beny Steen    Electronically signed by LYN Villarreal CNP on 1/8/2023 at 11:38 AM      Note: This report was completed utilizing computer voice recognition software. Every effort has been made to ensure accuracy, however; inadvertent computerized transcription errors may be present        I personally saw, examined and provided care for the patient. Radiographs, labs and medication list were reviewed by me independently. Her significant other is very helpful in obtaining history. Patient has been having symptoms of upper respiratory tract infection about a month ago. He was in [place] 3 weeks ago and apparently his sister also had had issues with her lungs including a lung nodule and also positive respiratory cultures for a Acinetobacter. Patient also had a dental procedure at around the same time and apparently the technician despite wearing a mask had a chronic cough.   2 days ago patient started having left-sided pleuritic chest pain and last night it got worse with radiation to his left anterior chest.    Impression:  Community-acquired pneumonia  Parapneumonic effusion/empyem    Recommendations:    I reviewed patient's images with him and explained that at this point he needs a diagnostic thoracentesis to rule out parapneumonic effusion versus empyema. He is agreeable to proceed with the procedure. Perform the procedure at the bedside. Please see procedure note . Check Legionella and strep urinary antigens , check procalcitonin  With empiric Zosyn and vancomycin       I spoke with bedside nursing, therapists and consultants. The case was discussed in detail and plans for care were established. Review of CNP documentation was conducted and revisions were made as appropriate. I agree with the above documented exam, problem list and plan of care.       Lemuel Casillas MD

## 2023-01-08 NOTE — PROGRESS NOTES
Hospitalist Progress Note      Synopsis: Patient admitted on 1/7/2023 Mr. Darleen Sheikh, a 50y.o. year old male  who  has a past medical history of Bipolar 1 disorder ([address] presents with one month of intermittent cough and SOB, associated with left chest pain more upon coughing associated with low grade fever. Has sputum production but clear. Ravi [place] left chest pain, no relieving factors, denies nausea or vomiting no abdominal pain or diarrhea no lightheadedness or tingling. Subjective  Seen and examined chart reviewed discussed with nursing staff. Patient family member at bedside  Patient had successful bedside thoracentesis this morning. He feels significantly better his shortness of breath is significantly improved. Denies any abdominal pain nausea or vomiting. Denies any chest pain    Exam:  /81   Pulse (!) 103   Temp 98.1 °F (36.7 °C) (Temporal)   Resp 16   Wt 186 lb (84.4 kg)   SpO2 95%   BMI 27.47 kg/m²   -General:  Awake, alert, oriented X 3. Well developed, well nourished, well groomed. No apparent distress. -HEENT:  Normocephalic, atraumatic. Pupils equal, round, reactive to light. No scleral icterus. No conjunctival injection. Normal lips, teeth, and gums. No nasal discharge. Neck:  Supple    -Heart:  RRR, no murmurs, gallops, rubs    -Lungs:  CTA bilaterally, bilat symmetrical expansion, no wheeze, rales, or rhonchi    -Abdomen: Bowel sounds present, soft, nontender, no masses, no organomegaly, no peritoneal signs    -Extremities: normal ROM. No Cyanosis clubbing or edema    -Skin: Warm and dry    -Neuro:  AAO x 3 . Cranial nerves 2-12 intact, no focal deficits     -Psych : normal .    Medications:  Reviewed    Infusion Medications    sodium chloride       Scheduled Medications    lidocaine        piperacillin-tazobactam  4,500 mg IntraVENous Q8H    vancomycin  1,000 mg IntraVENous Q12H    QUEtiapine  100 mg Oral QPM    buPROPion  200 mg Oral QAM    sodium chloride flush  10 mL IntraVENous 2 times per day    enoxaparin  40 mg SubCUTAneous Daily     PRN Meds: oxyCODONE, sodium chloride flush, sodium chloride, ondansetron **OR** ondansetron, magnesium hydroxide, acetaminophen **OR** acetaminophen    I/O  No intake or output data in the 24 hours ending 01/08/23 1120    Labs:   Recent Labs     01/07/23  1050 01/08/23  0619   WBC 11.2 13.1*   HGB 13.2 12.4*   HCT 39.0 36.6*   * 357       Recent Labs     01/07/23  1317 01/08/23  0619    136   K 4.3 4.1    101   CO2 22 20*   BUN 16 14   CREATININE 0.8 0.9   CALCIUM 9.2 9.2       Recent Labs     01/07/23  1317 01/08/23  0619   PROT 7.5 7.5   ALKPHOS 96 117   ALT 13 19   AST 10 14   BILITOT 0.6 0.8       No results for input(s): INR in the last 72 hours. No results for input(s): Karin Macleod in the last 72 hours. Chronic labs:  No results found for: CHOL, TRIG, HDL, LDLCALC, TSH, PSA, INR, LABA1C    Radiology:  Imaging studies reviewed today. ASSESSMENT:    Principal Problem:    Pleural effusion on left  Active Problems:    Pleural effusion  Resolved Problems:    * No resolved hospital problems. *       Assessment/PLAN:    - left pleuritic chest pain  - left pleural effusion   - fever      Plan:  -Status post thoracentesis 1/8 ,600 cc out. - serial troponin and check Echo  - empiric vanc/zosyn  - Consult Pulmonology for further recommendations  - Oxygen supplement as needed   - pain control      Diet: ADULT DIET; Regular  Code Status: Full Code  PT/OT Eval Status:     DVT Prophylaxis:     Recommended disposition at discharge:      +++++++++++++++++++++++++++++++++++++++++++++++++  Keith Whitman MD  1/8/2023  [facility].  +++++++++++++++++++++++++++++++++++++++++++++++++  NOTE: This report was transcribed using voice recognition software. Every effort was made to ensure accuracy; however, inadvertent computerized transcription errors may be present.

## 2023-01-08 NOTE — PROCEDURES
Thoracentesis Procedure Note    Pre-operative Diagnosis: left Pleural Effusion    Post-operative Diagnosis: same    Indications: Jelly Payan is a 50 y.o. male with a  has a past medical history of Bipolar 1 disorder ([facility] [address] found to have a Pleural Effusion  ultrasound guided        Consent: After informed consent & appropriate time out protocol was noted & obtained. Risks/Benefits/Alternatives of the procedure were discussed including: infection, bleeding, pain, pneumothorax. Procedure Details:    Under sterile conditions  the patient was placed in the sitting position. The skin was prepped and draped with Chloraprep solution and sterile drapes were utilized. 1% plain lidocaine was used to anesthetize the skin, subcutaneous tissue and parietal pleura. Then the pleural catheter 8 [place]   was advanced into the pleural space. The fluid was obtained without any difficulties and minimal blood loss. A total of 600 cc fluid was removed. The fluid was light serosanguinous/Pita in color. A dressing was applied to the wound and wound care instructions were provided. The fluid was sent for analysis. Findings  600 ml of pita/pink pleural fluid was obtained. A sample was sent for protein, cholesterol, and LDH,  Pathology for cytolology, cytogenetics, flow, and cell counts, as well as for infection analysis. Complications:  None; patient tolerated the procedure well. Condition: stable    Plan  A follow up chest x-ray was ordered.   Electronically signed by Dmitriy Matos MD on 1/8/2023 at 9:05 AM

## 2023-01-08 NOTE — PROGRESS NOTES
This patient is on medication that requires renal, weight, and/or indication dose adjustment. Date Body Weight IBW  Adjusted BW SCr  CrCl Dialysis status   1/7/2023 197 lb (89.4 kg) Patient height not recorded Creatinine clearance cannot be calculated (Unknown ideal weight.) N/a       Pharmacy has dose-adjusted the following medication(s):    Date Previous Order Adjusted Order   1/7/2023 Zosyn 3.375 g q8h Zosyn 4.5 g q8h       These changes were made per protocol according to the [facility] Clinical Guidance for Pharmacists. *Please note this dose may need readjusted if patient's condition changes. Please contact pharmacy with any questions regarding these changes.     ERICK Serrano [facility]  1/7/2023  11:25 PM

## 2023-01-09 ENCOUNTER — APPOINTMENT (OUTPATIENT)
Dept: CT IMAGING | Age: 48
End: 2023-01-09
Payer: COMMERCIAL

## 2023-01-09 ENCOUNTER — APPOINTMENT (OUTPATIENT)
Dept: GENERAL RADIOLOGY | Age: 48
End: 2023-01-09
Payer: COMMERCIAL

## 2023-01-09 LAB
ABO/RH: NORMAL
ALBUMIN SERPL-MCNC: 2.9 G/DL (ref 3.5–5.2)
ALP BLD-CCNC: 120 U/L (ref 40–129)
ALT SERPL-CCNC: 19 U/L (ref 0–40)
ANION GAP SERPL CALCULATED.3IONS-SCNC: 15 MMOL/L (ref 7–16)
ANION GAP SERPL CALCULATED.3IONS-SCNC: 16 MMOL/L (ref 7–16)
ANION GAP SERPL CALCULATED.3IONS-SCNC: 17 MMOL/L (ref 7–16)
ANTIBODY SCREEN: NORMAL
APPEARANCE FLUID: NORMAL
APTT: 35.7 SEC (ref 24.5–35.1)
AST SERPL-CCNC: 15 U/L (ref 0–39)
BASOPHILS ABSOLUTE: 0.05 E9/L (ref 0–0.2)
BASOPHILS RELATIVE PERCENT: 0.4 % (ref 0–2)
BILIRUB SERPL-MCNC: 0.9 MG/DL (ref 0–1.2)
BUN BLDV-MCNC: 17 MG/DL (ref 6–20)
BUN BLDV-MCNC: 22 MG/DL (ref 6–20)
BUN BLDV-MCNC: 24 MG/DL (ref 6–20)
BURR CELLS: ABNORMAL
C-REACTIVE PROTEIN: 33 MG/DL (ref 0–0.4)
CALCIUM SERPL-MCNC: 8.8 MG/DL (ref 8.6–10.2)
CALCIUM SERPL-MCNC: 8.9 MG/DL (ref 8.6–10.2)
CALCIUM SERPL-MCNC: 9 MG/DL (ref 8.6–10.2)
CELL COUNT FLUID TYPE: NORMAL
CHLORIDE BLD-SCNC: 100 MMOL/L (ref 98–107)
CHLORIDE BLD-SCNC: 96 MMOL/L (ref 98–107)
CHLORIDE BLD-SCNC: 98 MMOL/L (ref 98–107)
CO2: 17 MMOL/L (ref 22–29)
CO2: 19 MMOL/L (ref 22–29)
CO2: 20 MMOL/L (ref 22–29)
COLOR FLUID: NORMAL
CREAT SERPL-MCNC: 1.7 MG/DL (ref 0.7–1.2)
CREAT SERPL-MCNC: 3 MG/DL (ref 0.7–1.2)
CREAT SERPL-MCNC: 3.1 MG/DL (ref 0.7–1.2)
EKG ATRIAL RATE: 107 BPM
EKG ATRIAL RATE: 97 BPM
EKG P AXIS: 34 DEGREES
EKG P AXIS: 36 DEGREES
EKG P-R INTERVAL: 134 MS
EKG P-R INTERVAL: 84 MS
EKG Q-T INTERVAL: 304 MS
EKG Q-T INTERVAL: 316 MS
EKG QRS DURATION: 80 MS
EKG QRS DURATION: 80 MS
EKG QTC CALCULATION (BAZETT): 386 MS
EKG QTC CALCULATION (BAZETT): 421 MS
EKG R AXIS: -4 DEGREES
EKG R AXIS: 0 DEGREES
EKG T AXIS: -97 DEGREES
EKG T AXIS: 7 DEGREES
EKG VENTRICULAR RATE: 107 BPM
EKG VENTRICULAR RATE: 97 BPM
EOSINOPHILS ABSOLUTE: 0.19 E9/L (ref 0.05–0.5)
EOSINOPHILS RELATIVE PERCENT: 1.6 % (ref 0–6)
GFR SERPL CREATININE-BSD FRML MDRD: 24 ML/MIN/1.73
GFR SERPL CREATININE-BSD FRML MDRD: 25 ML/MIN/1.73
GFR SERPL CREATININE-BSD FRML MDRD: 49 ML/MIN/1.73
GLUCOSE BLD-MCNC: 113 MG/DL (ref 74–99)
GLUCOSE BLD-MCNC: 114 MG/DL (ref 74–99)
GLUCOSE BLD-MCNC: 144 MG/DL (ref 74–99)
GRAM STAIN ORDERABLE: NORMAL
HCT VFR BLD CALC: 37.4 % (ref 37–54)
HEMOGLOBIN: 12.6 G/DL (ref 12.5–16.5)
IMMATURE GRANULOCYTES #: 0.07 E9/L
IMMATURE GRANULOCYTES %: 0.6 % (ref 0–5)
INR BLD: 1.3
LV EF: 55 %
LVEF MODALITY: NORMAL
LYMPHOCYTES ABSOLUTE: 0.85 E9/L (ref 1.5–4)
LYMPHOCYTES RELATIVE PERCENT: 7.3 % (ref 20–42)
MAGNESIUM: 2.3 MG/DL (ref 1.6–2.6)
MCH RBC QN AUTO: 28.6 PG (ref 26–35)
MCHC RBC AUTO-ENTMCNC: 33.7 % (ref 32–34.5)
MCV RBC AUTO: 84.8 FL (ref 80–99.9)
MONOCYTE, FLUID: 41 %
MONOCYTES ABSOLUTE: 1.51 E9/L (ref 0.1–0.95)
MONOCYTES RELATIVE PERCENT: 12.9 % (ref 2–12)
NEUTROPHIL, FLUID: 59 %
NEUTROPHILS ABSOLUTE: 9.05 E9/L (ref 1.8–7.3)
NEUTROPHILS RELATIVE PERCENT: 77.2 % (ref 43–80)
NUCLEATED CELLS FLUID: 7014 /UL
OVALOCYTES: ABNORMAL
PDW BLD-RTO: 12.7 FL (ref 11.5–15)
PLATELET # BLD: 344 E9/L (ref 130–450)
PMV BLD AUTO: 9.8 FL (ref 7–12)
POIKILOCYTES: ABNORMAL
POTASSIUM REFLEX MAGNESIUM: 4.2 MMOL/L (ref 3.5–5)
POTASSIUM SERPL-SCNC: 3.9 MMOL/L (ref 3.5–5)
POTASSIUM SERPL-SCNC: 4 MMOL/L (ref 3.5–5)
PROCALCITONIN: 0.45 NG/ML (ref 0–0.08)
PROTHROMBIN TIME: 14.6 SEC (ref 9.3–12.4)
RBC # BLD: 4.41 E12/L (ref 3.8–5.8)
RBC FLUID: NORMAL /UL
SEDIMENTATION RATE, ERYTHROCYTE: 95 MM/HR (ref 0–15)
SODIUM BLD-SCNC: 131 MMOL/L (ref 132–146)
SODIUM BLD-SCNC: 132 MMOL/L (ref 132–146)
SODIUM BLD-SCNC: 135 MMOL/L (ref 132–146)
TEAR DROP CELLS: ABNORMAL
TOTAL PROTEIN: 7.1 G/DL (ref 6.4–8.3)
WBC # BLD: 11.7 E9/L (ref 4.5–11.5)

## 2023-01-09 PROCEDURE — 86140 C-REACTIVE PROTEIN: CPT

## 2023-01-09 PROCEDURE — 85025 COMPLETE CBC W/AUTO DIFF WBC: CPT

## 2023-01-09 PROCEDURE — 71250 CT THORAX DX C-: CPT

## 2023-01-09 PROCEDURE — 36415 COLL VENOUS BLD VENIPUNCTURE: CPT

## 2023-01-09 PROCEDURE — 93010 ELECTROCARDIOGRAM REPORT: CPT | Performed by: INTERNAL MEDICINE

## 2023-01-09 PROCEDURE — 6360000004 HC RX CONTRAST MEDICATION: Performed by: INTERNAL MEDICINE

## 2023-01-09 PROCEDURE — 6370000000 HC RX 637 (ALT 250 FOR IP): Performed by: INTERNAL MEDICINE

## 2023-01-09 PROCEDURE — 86901 BLOOD TYPING SEROLOGIC RH(D): CPT

## 2023-01-09 PROCEDURE — 80048 BASIC METABOLIC PNL TOTAL CA: CPT

## 2023-01-09 PROCEDURE — 2580000003 HC RX 258: Performed by: INTERNAL MEDICINE

## 2023-01-09 PROCEDURE — 86900 BLOOD TYPING SEROLOGIC ABO: CPT

## 2023-01-09 PROCEDURE — 87081 CULTURE SCREEN ONLY: CPT

## 2023-01-09 PROCEDURE — 93306 TTE W/DOPPLER COMPLETE: CPT

## 2023-01-09 PROCEDURE — 6360000002 HC RX W HCPCS: Performed by: INTERNAL MEDICINE

## 2023-01-09 PROCEDURE — 86850 RBC ANTIBODY SCREEN: CPT

## 2023-01-09 PROCEDURE — 84145 PROCALCITONIN (PCT): CPT

## 2023-01-09 PROCEDURE — 83735 ASSAY OF MAGNESIUM: CPT

## 2023-01-09 PROCEDURE — 85730 THROMBOPLASTIN TIME PARTIAL: CPT

## 2023-01-09 PROCEDURE — 80053 COMPREHEN METABOLIC PANEL: CPT

## 2023-01-09 PROCEDURE — 71045 X-RAY EXAM CHEST 1 VIEW: CPT

## 2023-01-09 PROCEDURE — 1200000000 HC SEMI PRIVATE

## 2023-01-09 PROCEDURE — 6370000000 HC RX 637 (ALT 250 FOR IP): Performed by: FAMILY MEDICINE

## 2023-01-09 PROCEDURE — 85651 RBC SED RATE NONAUTOMATED: CPT

## 2023-01-09 PROCEDURE — 86923 COMPATIBILITY TEST ELECTRIC: CPT

## 2023-01-09 PROCEDURE — 85610 PROTHROMBIN TIME: CPT

## 2023-01-09 RX ORDER — 0.9 % SODIUM CHLORIDE 0.9 %
500 INTRAVENOUS SOLUTION INTRAVENOUS ONCE
Status: DISCONTINUED | OUTPATIENT
Start: 2023-01-09 | End: 2023-01-09

## 2023-01-09 RX ORDER — BUPROPION HYDROCHLORIDE 300 MG/1
300 TABLET ORAL DAILY
Status: DISCONTINUED | OUTPATIENT
Start: 2023-01-10 | End: 2023-01-14 | Stop reason: HOSPADM

## 2023-01-09 RX ORDER — BUPROPION HYDROCHLORIDE 150 MG/1
300 TABLET, EXTENDED RELEASE ORAL DAILY
Status: DISCONTINUED | OUTPATIENT
Start: 2023-01-10 | End: 2023-01-09

## 2023-01-09 RX ORDER — 0.9 % SODIUM CHLORIDE 0.9 %
500 INTRAVENOUS SOLUTION INTRAVENOUS ONCE
Status: COMPLETED | OUTPATIENT
Start: 2023-01-09 | End: 2023-01-09

## 2023-01-09 RX ORDER — SODIUM CHLORIDE 9 MG/ML
INJECTION, SOLUTION INTRAVENOUS CONTINUOUS
Status: DISCONTINUED | OUTPATIENT
Start: 2023-01-09 | End: 2023-01-10

## 2023-01-09 RX ORDER — OXYCODONE HYDROCHLORIDE AND ACETAMINOPHEN 5; 325 MG/1; MG/1
1 TABLET ORAL EVERY 4 HOURS PRN
Status: DISCONTINUED | OUTPATIENT
Start: 2023-01-09 | End: 2023-01-10

## 2023-01-09 RX ADMIN — OXYCODONE HYDROCHLORIDE 5 MG: 5 TABLET ORAL at 05:10

## 2023-01-09 RX ADMIN — SODIUM CHLORIDE 500 ML: 9 INJECTION, SOLUTION INTRAVENOUS at 21:57

## 2023-01-09 RX ADMIN — PIPERACILLIN AND TAZOBACTAM 4500 MG: 4; .5 INJECTION, POWDER, LYOPHILIZED, FOR SOLUTION INTRAVENOUS at 08:26

## 2023-01-09 RX ADMIN — OXYCODONE AND ACETAMINOPHEN 1 TABLET: 5; 325 TABLET ORAL at 16:31

## 2023-01-09 RX ADMIN — ONDANSETRON 4 MG: 2 INJECTION INTRAMUSCULAR; INTRAVENOUS at 08:24

## 2023-01-09 RX ADMIN — PERFLUTREN 1.5 ML: 6.52 INJECTION, SUSPENSION INTRAVENOUS at 10:10

## 2023-01-09 RX ADMIN — PIPERACILLIN AND TAZOBACTAM 4500 MG: 4; .5 INJECTION, POWDER, LYOPHILIZED, FOR SOLUTION INTRAVENOUS at 00:49

## 2023-01-09 RX ADMIN — OXYCODONE HYDROCHLORIDE 5 MG: 5 TABLET ORAL at 10:15

## 2023-01-09 RX ADMIN — ENOXAPARIN SODIUM 40 MG: 100 INJECTION SUBCUTANEOUS at 10:11

## 2023-01-09 RX ADMIN — QUETIAPINE FUMARATE 100 MG: 100 TABLET ORAL at 20:48

## 2023-01-09 RX ADMIN — SODIUM CHLORIDE: 9 INJECTION, SOLUTION INTRAVENOUS at 23:59

## 2023-01-09 RX ADMIN — Medication 10 ML: at 08:32

## 2023-01-09 RX ADMIN — ONDANSETRON 4 MG: 2 INJECTION INTRAMUSCULAR; INTRAVENOUS at 16:31

## 2023-01-09 RX ADMIN — BUPROPION HYDROCHLORIDE 200 MG: 100 TABLET, FILM COATED ORAL at 10:11

## 2023-01-09 RX ADMIN — OXYCODONE AND ACETAMINOPHEN 1 TABLET: 5; 325 TABLET ORAL at 20:48

## 2023-01-09 RX ADMIN — PIPERACILLIN AND TAZOBACTAM 4500 MG: 4; .5 INJECTION, POWDER, LYOPHILIZED, FOR SOLUTION INTRAVENOUS at 16:43

## 2023-01-09 RX ADMIN — VANCOMYCIN HYDROCHLORIDE 1000 MG: 1 INJECTION, POWDER, LYOPHILIZED, FOR SOLUTION INTRAVENOUS at 12:54

## 2023-01-09 RX ADMIN — FUROSEMIDE 20 MG: 20 TABLET ORAL at 10:12

## 2023-01-09 ASSESSMENT — PAIN SCALES - GENERAL
PAINLEVEL_OUTOF10: 7
PAINLEVEL_OUTOF10: 7
PAINLEVEL_OUTOF10: 6
PAINLEVEL_OUTOF10: 4

## 2023-01-09 ASSESSMENT — PAIN DESCRIPTION - LOCATION
LOCATION: RIB CAGE
LOCATION: CHEST
LOCATION: CHEST;RIB CAGE
LOCATION: RIB CAGE
LOCATION: FLANK

## 2023-01-09 ASSESSMENT — PAIN DESCRIPTION - ORIENTATION
ORIENTATION: LEFT

## 2023-01-09 ASSESSMENT — PAIN DESCRIPTION - DESCRIPTORS
DESCRIPTORS: ACHING;DULL;DISCOMFORT
DESCRIPTORS: ACHING
DESCRIPTORS: SHARP;DISCOMFORT;ACHING

## 2023-01-09 NOTE — CARE COORDINATION
1/9 Care Coordination: Patient presents with chief complaint of cough, congestion as well as chest pain. Dx Pleural effusion on left . Consult Pulmonology, thoracentesis 1/8 ,600 cc out. empiric vanc/zosyn. On RA. PTA Pt indep. Await  CTS consult. CM/SW will continue to follow for discharge planning.    Daysi JOYNER,RN-CV-BC  [phone]

## 2023-01-09 NOTE — PROGRESS NOTES
Talat Hunt M.D.,[facility]  Kristine Rich D.O., F.A.C.O.I., Bala Farley M.D. Indiana Bernstein M.D. Lula Maharaj D.O. Daily Pulmonary Progress Note    Patient:  Mehdi Sauceda 50 y.o. male MRN: [id number]     Date of Service: 1/9/2023      Synopsis     We are following patient for pleural effusion    \"CC\" shortness of breath    Code status: Full      Subjective      Patient was seen and examined. Receiving 2D echo at bedside. Not requiring supplemental oxygen. Mild shortness of breath, left-sided chest discomfort post left thoracentesis. Chest x-ray completed today with persistent basilar infiltrate, effusion, atelectasis. Review of Systems:  Constitutional: Denies fever, weight loss, night sweats, and fatigue  Skin: Denies pigmentation, dark lesions, and rashes   HEENT: Denies hearing loss, tinnitus, ear drainage, epistaxis, sore throat, and hoarseness. Cardiovascular: Denies palpitations, chest pain, and chest pressure. Respiratory: Denies cough, dyspnea at rest, hemoptysis, apnea, and choking.   Gastrointestinal: Denies nausea, vomiting, poor appetite, diarrhea, heartburn or reflux  Genitourinary: Denies dysuria, frequency, urgency or hematuria  Musculoskeletal: Denies myalgias, muscle weakness, and bone pain  Neurological: Denies dizziness, vertigo, headache, and focal weakness  Psychological: Denies anxiety and depression  Endocrine: Denies heat intolerance and cold intolerance  Hematopoietic/Lymphatic: Denies bleeding problems and blood transfusions    24-hour events:  Fluid consistent with exudate    Objective   Vitals: /86   Pulse 89   Temp 99 °F (37.2 °C) (Temporal)   Resp 16   Wt 184 lb 12.8 oz (83.8 kg)   SpO2 94%   BMI 27.29 kg/m²     I/O:    Intake/Output Summary (Last 24 hours) at 1/9/2023 1249  Last data filed at 1/9/2023 0513  Gross per 24 hour   Intake 480 ml   Output 1300 ml   Net -820 ml                        CURRENT MEDS :  Scheduled Meds: furosemide  20 mg Oral Daily    piperacillin-tazobactam  4,500 mg IntraVENous Q8H    vancomycin  1,000 mg IntraVENous Q12H    QUEtiapine  100 mg Oral QPM    buPROPion  200 mg Oral QAM    sodium chloride flush  10 mL IntraVENous 2 times per day    enoxaparin  40 mg SubCUTAneous Daily       Physical Exam:  General Appearance: appears comfortable in no acute distress. HEENT: Normocephalic atraumatic without obvious abnormality   Neck: Lips, mucosa, and tongue normal.  Supple, symmetrical, trachea midline, no adenopathy;thyroid:  no enlargement/tenderness/nodules or JVD. Lung: Breath sounds slightly diminished left base respirations   unlabored. Symmetrical expansion. Heart: RRR, normal S1, S2. No MRG  Abdomen: Soft, NT, ND. BS present x 4 quadrants. No bruit or organomegaly. Extremities: Pedal pulses 2+ symmetric b/l. Extremities normal, no cyanosis, clubbing, or edema. Musculokeletal: No joint swelling, no muscle tenderness. ROM normal in all joints of extremities. Neurologic: Mental status: Alert and Oriented X3 . Pertinent/ New Labs and Imaging Studies     Imaging Personally Reviewed:  1/9/2023 chest x-ray  FINDINGS:   Cardiac size remains enlarged with opacifications at the left lung base of   moderate left pleural effusion and adjacent atelectasis. No pneumothorax. Degenerate changes of the spine. Impression   Stable moderate volume left pleural effusion with adjacent atelectasis                 CTA chest on 1/7/23  Impression   1. No indication for acute pulmonary emboli. 2.  No aneurysm formation or dissection of the thoracic aorta. 3.  Moderate to large size left-sided pleural effusion causes significant   compression atelectasis of the left lower lobe with patent central airways.        RECOMMENDATIONS:   Unavailable         ECHO  1/9/2023-pending    Labs:  Lab Results   Component Value Date/Time    WBC 11.7 01/09/2023 06:31 AM    HGB 12.6 01/09/2023 06:31 AM    HCT 37.4 01/09/2023 06:31 AM    MCV 84.8 01/09/2023 06:31 AM    MCH 28.6 01/09/2023 06:31 AM    MCHC 33.7 01/09/2023 06:31 AM    RDW 12.7 01/09/2023 06:31 AM     01/09/2023 06:31 AM    MPV 9.8 01/09/2023 06:31 AM     Lab Results   Component Value Date/Time     01/09/2023 06:31 AM    K 4.0 01/09/2023 06:31 AM    K 4.1 01/08/2023 06:19 AM     01/09/2023 06:31 AM    CO2 19 01/09/2023 06:31 AM    BUN 17 01/09/2023 06:31 AM    CREATININE 1.7 01/09/2023 06:31 AM    LABALBU 2.9 01/09/2023 06:31 AM    CALCIUM 8.9 01/09/2023 06:31 AM    LABGLOM 49 01/09/2023 06:31 AM     No results found for: PROTIME, INR  Recent Labs     01/07/23  1317   PROBNP 15     Recent Labs     01/09/23  0639   PROCAL 0.45*     This SmartLink has not been configured with any valid records. Micro:  No results for input(s): CULTRESP in the last 72 hours. Recent Labs     01/08/23  0830   LABGRAM Refer to ordered Gram stain for results     No results for input(s): LEGUR in the last 72 hours. No results for input(s): STREPNEUMAGU in the last 72 hours. No results for input(s): LP1UAG in the last 72 hours.     Pleural fluid studies consistent with exudate    Latest Reference Range & Units 1/8/23 08:30 1/8/23 09:11   Source + CELL CT/DIFF-BF  Pleural    Appearance, Fluid  Cloudy    Color, Fluid  Pink    Fluid Type  Pleural    Glucose, Fluid Not Established mg/dL 91    LD, Fluid Not Established U/L 448    RBC, Fluid /uL 17,000    Monocyte Count, Fluid % 41 (C)    Neutrophil Count, Fluid % 59    Nucl Cell, Fluid /uL 7,014 (C)    pH, Fluid   7.266   Protein, Fluid Not Established g/dL 5.4    (C): Corrected  Pleural fluid culture 1/8/2023  Component 1/8/23 0830    Gram Stain Orderable Gram stain performed on unspun fluid   Rare Polymorphonuclear leukocytes   Epithelial cells not seen   No organisms seen       Assessment:    Moderate to large sized left pleural effusion with compressive atelectasis  Recent URI 1 month ago, recent dental procedure  Left thoracentesis 1/8/2023 fluid consistent with exudate, possible empyema vs parapneumonic effusion  Community-acquired pneumonia  Fevers  Left-sided pleuritic chest pain  Bipolar 1 disorder    Plan:   Continue antibiotics Zosyn and vancomycin. Await final fluid studies, culture and cytology pending  Repeat chest x-ray 1/9/2023  Monitor off oxygen keep SPO2 greater than 92%  Diuresis Lasix 20 mg p.o. daily  2D echo completed today  Analgesics as needed for a left sided pleuritic chest pain  DVT, GI prophylaxis      This plan of care was reviewed in collaboration with Dr. Sriram Martinez   Electronically signed by LYN Mejia CNP on 1/9/2023 at 12:49 PM      I personally saw, examined, and cared for the patient. Labs, medications, radiographs reviewed. I agree with history exam and plans detailed in NP note. Loculated pleural effusion. Complicated pleural effusion. No improvement on CXR with thoracentesis. Repeat CT chest for further evaluation of pleural space. Consult Thoracic surgery for eval and possible need for VATs. Patient and spouse updated.     Selena Esqueda DO

## 2023-01-10 ENCOUNTER — APPOINTMENT (OUTPATIENT)
Dept: GENERAL RADIOLOGY | Age: 48
End: 2023-01-10
Payer: COMMERCIAL

## 2023-01-10 ENCOUNTER — ANESTHESIA (OUTPATIENT)
Dept: OPERATING ROOM | Age: 48
End: 2023-01-10
Payer: COMMERCIAL

## 2023-01-10 ENCOUNTER — ANESTHESIA EVENT (OUTPATIENT)
Dept: OPERATING ROOM | Age: 48
End: 2023-01-10
Payer: COMMERCIAL

## 2023-01-10 PROBLEM — J86.9 EMPYEMA (HCC): Status: ACTIVE | Noted: 2023-01-10

## 2023-01-10 LAB
AFB SMEAR: NORMAL
ALBUMIN SERPL-MCNC: 3.3 G/DL (ref 3.5–5.2)
ALP BLD-CCNC: 118 U/L (ref 40–129)
ALT SERPL-CCNC: 17 U/L (ref 0–40)
ANION GAP SERPL CALCULATED.3IONS-SCNC: 15 MMOL/L (ref 7–16)
ANION GAP SERPL CALCULATED.3IONS-SCNC: 16 MMOL/L (ref 7–16)
ANION GAP SERPL CALCULATED.3IONS-SCNC: 16 MMOL/L (ref 7–16)
ANION GAP SERPL CALCULATED.3IONS-SCNC: 17 MMOL/L (ref 7–16)
ANISOCYTOSIS: ABNORMAL
AST SERPL-CCNC: 14 U/L (ref 0–39)
BASOPHILS ABSOLUTE: 0 E9/L (ref 0–0.2)
BASOPHILS ABSOLUTE: 0.03 E9/L (ref 0–0.2)
BASOPHILS RELATIVE PERCENT: 0.3 % (ref 0–2)
BASOPHILS RELATIVE PERCENT: 0.4 % (ref 0–2)
BILIRUB SERPL-MCNC: 0.7 MG/DL (ref 0–1.2)
BUN BLDV-MCNC: 26 MG/DL (ref 6–20)
BUN BLDV-MCNC: 29 MG/DL (ref 6–20)
CALCIUM SERPL-MCNC: 8.3 MG/DL (ref 8.6–10.2)
CALCIUM SERPL-MCNC: 8.5 MG/DL (ref 8.6–10.2)
CALCIUM SERPL-MCNC: 8.5 MG/DL (ref 8.6–10.2)
CALCIUM SERPL-MCNC: 8.9 MG/DL (ref 8.6–10.2)
CHLORIDE BLD-SCNC: 102 MMOL/L (ref 98–107)
CHLORIDE BLD-SCNC: 103 MMOL/L (ref 98–107)
CHLORIDE BLD-SCNC: 106 MMOL/L (ref 98–107)
CHLORIDE BLD-SCNC: 106 MMOL/L (ref 98–107)
CO2: 16 MMOL/L (ref 22–29)
CO2: 16 MMOL/L (ref 22–29)
CO2: 18 MMOL/L (ref 22–29)
CO2: 20 MMOL/L (ref 22–29)
CREAT SERPL-MCNC: 3.2 MG/DL (ref 0.7–1.2)
CREAT SERPL-MCNC: 3.3 MG/DL (ref 0.7–1.2)
CREAT SERPL-MCNC: 3.4 MG/DL (ref 0.7–1.2)
CREAT SERPL-MCNC: 3.5 MG/DL (ref 0.7–1.2)
EOSINOPHILS ABSOLUTE: 0 E9/L (ref 0.05–0.5)
EOSINOPHILS ABSOLUTE: 0.13 E9/L (ref 0.05–0.5)
EOSINOPHILS RELATIVE PERCENT: 0.4 % (ref 0–6)
EOSINOPHILS RELATIVE PERCENT: 1.6 % (ref 0–6)
GFR SERPL CREATININE-BSD FRML MDRD: 21 ML/MIN/1.73
GFR SERPL CREATININE-BSD FRML MDRD: 21 ML/MIN/1.73
GFR SERPL CREATININE-BSD FRML MDRD: 22 ML/MIN/1.73
GFR SERPL CREATININE-BSD FRML MDRD: 23 ML/MIN/1.73
GLUCOSE BLD-MCNC: 102 MG/DL (ref 74–99)
GLUCOSE BLD-MCNC: 105 MG/DL (ref 74–99)
GLUCOSE BLD-MCNC: 120 MG/DL (ref 74–99)
GLUCOSE BLD-MCNC: 130 MG/DL (ref 74–99)
HCT VFR BLD CALC: 35.4 % (ref 37–54)
HCT VFR BLD CALC: 35.9 % (ref 37–54)
HCT VFR BLD CALC: 37.2 % (ref 37–54)
HEMOGLOBIN: 11.6 G/DL (ref 12.5–16.5)
HEMOGLOBIN: 11.7 G/DL (ref 12.5–16.5)
HEMOGLOBIN: 12.6 G/DL (ref 12.5–16.5)
IMMATURE GRANULOCYTES #: 0.04 E9/L
IMMATURE GRANULOCYTES %: 0.5 % (ref 0–5)
LYMPHOCYTES ABSOLUTE: 0.28 E9/L (ref 1.5–4)
LYMPHOCYTES ABSOLUTE: 0.84 E9/L (ref 1.5–4)
LYMPHOCYTES RELATIVE PERCENT: 1.7 % (ref 20–42)
LYMPHOCYTES RELATIVE PERCENT: 10 % (ref 20–42)
MAGNESIUM: 2.2 MG/DL (ref 1.6–2.6)
MAGNESIUM: 2.6 MG/DL (ref 1.6–2.6)
MCH RBC QN AUTO: 27.8 PG (ref 26–35)
MCH RBC QN AUTO: 28.1 PG (ref 26–35)
MCH RBC QN AUTO: 28.2 PG (ref 26–35)
MCHC RBC AUTO-ENTMCNC: 32.6 % (ref 32–34.5)
MCHC RBC AUTO-ENTMCNC: 32.8 % (ref 32–34.5)
MCHC RBC AUTO-ENTMCNC: 33.9 % (ref 32–34.5)
MCV RBC AUTO: 82.1 FL (ref 80–99.9)
MCV RBC AUTO: 85.9 FL (ref 80–99.9)
MCV RBC AUTO: 86.1 FL (ref 80–99.9)
MONOCYTES ABSOLUTE: 0.57 E9/L (ref 0.1–0.95)
MONOCYTES ABSOLUTE: 0.94 E9/L (ref 0.1–0.95)
MONOCYTES RELATIVE PERCENT: 11.2 % (ref 2–12)
MONOCYTES RELATIVE PERCENT: 4.2 % (ref 2–12)
NEUTROPHILS ABSOLUTE: 13.35 E9/L (ref 1.8–7.3)
NEUTROPHILS ABSOLUTE: 6.38 E9/L (ref 1.8–7.3)
NEUTROPHILS RELATIVE PERCENT: 76.3 % (ref 43–80)
NEUTROPHILS RELATIVE PERCENT: 94.1 % (ref 43–80)
OVALOCYTES: ABNORMAL
PDW BLD-RTO: 12.7 FL (ref 11.5–15)
PDW BLD-RTO: 12.9 FL (ref 11.5–15)
PDW BLD-RTO: 12.9 FL (ref 11.5–15)
PLATELET # BLD: 372 E9/L (ref 130–450)
PLATELET # BLD: 374 E9/L (ref 130–450)
PLATELET # BLD: 384 E9/L (ref 130–450)
PMV BLD AUTO: 9.6 FL (ref 7–12)
PMV BLD AUTO: 9.8 FL (ref 7–12)
PMV BLD AUTO: 9.9 FL (ref 7–12)
POIKILOCYTES: ABNORMAL
POTASSIUM SERPL-SCNC: 4.1 MMOL/L (ref 3.5–5)
POTASSIUM SERPL-SCNC: 4.2 MMOL/L (ref 3.5–5)
POTASSIUM SERPL-SCNC: 4.8 MMOL/L (ref 3.5–5)
POTASSIUM SERPL-SCNC: 5 MMOL/L (ref 3.5–5)
RBC # BLD: 4.12 E12/L (ref 3.8–5.8)
RBC # BLD: 4.17 E12/L (ref 3.8–5.8)
RBC # BLD: 4.53 E12/L (ref 3.8–5.8)
SODIUM BLD-SCNC: 136 MMOL/L (ref 132–146)
SODIUM BLD-SCNC: 138 MMOL/L (ref 132–146)
SODIUM BLD-SCNC: 138 MMOL/L (ref 132–146)
SODIUM BLD-SCNC: 139 MMOL/L (ref 132–146)
TOTAL PROTEIN: 7.1 G/DL (ref 6.4–8.3)
WBC # BLD: 12.1 E9/L (ref 4.5–11.5)
WBC # BLD: 14.2 E9/L (ref 4.5–11.5)
WBC # BLD: 8.4 E9/L (ref 4.5–11.5)

## 2023-01-10 PROCEDURE — 2580000003 HC RX 258: Performed by: PHYSICIAN ASSISTANT

## 2023-01-10 PROCEDURE — 6360000002 HC RX W HCPCS

## 2023-01-10 PROCEDURE — 2580000003 HC RX 258: Performed by: INTERNAL MEDICINE

## 2023-01-10 PROCEDURE — 87102 FUNGUS ISOLATION CULTURE: CPT

## 2023-01-10 PROCEDURE — 6370000000 HC RX 637 (ALT 250 FOR IP): Performed by: PHYSICIAN ASSISTANT

## 2023-01-10 PROCEDURE — 80053 COMPREHEN METABOLIC PANEL: CPT

## 2023-01-10 PROCEDURE — 87206 SMEAR FLUORESCENT/ACID STAI: CPT

## 2023-01-10 PROCEDURE — 87116 MYCOBACTERIA CULTURE: CPT

## 2023-01-10 PROCEDURE — 7100000000 HC PACU RECOVERY - FIRST 15 MIN: Performed by: STUDENT IN AN ORGANIZED HEALTH CARE EDUCATION/TRAINING PROGRAM

## 2023-01-10 PROCEDURE — 87075 CULTR BACTERIA EXCEPT BLOOD: CPT

## 2023-01-10 PROCEDURE — 87070 CULTURE OTHR SPECIMN AEROBIC: CPT

## 2023-01-10 PROCEDURE — 71045 X-RAY EXAM CHEST 1 VIEW: CPT

## 2023-01-10 PROCEDURE — 2500000003 HC RX 250 WO HCPCS

## 2023-01-10 PROCEDURE — 6370000000 HC RX 637 (ALT 250 FOR IP): Performed by: INTERNAL MEDICINE

## 2023-01-10 PROCEDURE — 6360000002 HC RX W HCPCS: Performed by: INTERNAL MEDICINE

## 2023-01-10 PROCEDURE — 80048 BASIC METABOLIC PNL TOTAL CA: CPT

## 2023-01-10 PROCEDURE — 3600000016 HC SURGERY LEVEL 6 ADDTL 15MIN: Performed by: STUDENT IN AN ORGANIZED HEALTH CARE EDUCATION/TRAINING PROGRAM

## 2023-01-10 PROCEDURE — 87205 SMEAR GRAM STAIN: CPT

## 2023-01-10 PROCEDURE — 83735 ASSAY OF MAGNESIUM: CPT

## 2023-01-10 PROCEDURE — 6360000002 HC RX W HCPCS: Performed by: PHYSICIAN ASSISTANT

## 2023-01-10 PROCEDURE — 2500000003 HC RX 250 WO HCPCS: Performed by: STUDENT IN AN ORGANIZED HEALTH CARE EDUCATION/TRAINING PROGRAM

## 2023-01-10 PROCEDURE — 7100000001 HC PACU RECOVERY - ADDTL 15 MIN: Performed by: STUDENT IN AN ORGANIZED HEALTH CARE EDUCATION/TRAINING PROGRAM

## 2023-01-10 PROCEDURE — 0B5P4ZZ DESTRUCTION OF LEFT PLEURA, PERCUTANEOUS ENDOSCOPIC APPROACH: ICD-10-PCS | Performed by: STUDENT IN AN ORGANIZED HEALTH CARE EDUCATION/TRAINING PROGRAM

## 2023-01-10 PROCEDURE — 6360000002 HC RX W HCPCS: Performed by: ANESTHESIOLOGY

## 2023-01-10 PROCEDURE — 2140000000 HC CCU INTERMEDIATE R&B

## 2023-01-10 PROCEDURE — 94664 DEMO&/EVAL PT USE INHALER: CPT

## 2023-01-10 PROCEDURE — 3700000000 HC ANESTHESIA ATTENDED CARE: Performed by: STUDENT IN AN ORGANIZED HEALTH CARE EDUCATION/TRAINING PROGRAM

## 2023-01-10 PROCEDURE — 36415 COLL VENOUS BLD VENIPUNCTURE: CPT

## 2023-01-10 PROCEDURE — 3700000001 HC ADD 15 MINUTES (ANESTHESIA): Performed by: STUDENT IN AN ORGANIZED HEALTH CARE EDUCATION/TRAINING PROGRAM

## 2023-01-10 PROCEDURE — 85025 COMPLETE CBC W/AUTO DIFF WBC: CPT

## 2023-01-10 PROCEDURE — 0BJ08ZZ INSPECTION OF TRACHEOBRONCHIAL TREE, VIA NATURAL OR ARTIFICIAL OPENING ENDOSCOPIC: ICD-10-PCS | Performed by: STUDENT IN AN ORGANIZED HEALTH CARE EDUCATION/TRAINING PROGRAM

## 2023-01-10 PROCEDURE — 85027 COMPLETE CBC AUTOMATED: CPT

## 2023-01-10 PROCEDURE — 99254 IP/OBS CNSLTJ NEW/EST MOD 60: CPT | Performed by: STUDENT IN AN ORGANIZED HEALTH CARE EDUCATION/TRAINING PROGRAM

## 2023-01-10 PROCEDURE — 2709999900 HC NON-CHARGEABLE SUPPLY: Performed by: STUDENT IN AN ORGANIZED HEALTH CARE EDUCATION/TRAINING PROGRAM

## 2023-01-10 PROCEDURE — 3600000006 HC SURGERY LEVEL 6 BASE: Performed by: STUDENT IN AN ORGANIZED HEALTH CARE EDUCATION/TRAINING PROGRAM

## 2023-01-10 PROCEDURE — C1729 CATH, DRAINAGE: HCPCS | Performed by: STUDENT IN AN ORGANIZED HEALTH CARE EDUCATION/TRAINING PROGRAM

## 2023-01-10 PROCEDURE — 32652 THORACOSCOPY REM TOTL CORTEX: CPT | Performed by: PHYSICIAN ASSISTANT

## 2023-01-10 PROCEDURE — 32652 THORACOSCOPY REM TOTL CORTEX: CPT | Performed by: STUDENT IN AN ORGANIZED HEALTH CARE EDUCATION/TRAINING PROGRAM

## 2023-01-10 PROCEDURE — 2580000003 HC RX 258

## 2023-01-10 RX ORDER — MORPHINE SULFATE 2 MG/ML
2 INJECTION, SOLUTION INTRAMUSCULAR; INTRAVENOUS EVERY 4 HOURS PRN
Status: DISCONTINUED | OUTPATIENT
Start: 2023-01-10 | End: 2023-01-14 | Stop reason: HOSPADM

## 2023-01-10 RX ORDER — CEFAZOLIN SODIUM 1 G/3ML
INJECTION, POWDER, FOR SOLUTION INTRAMUSCULAR; INTRAVENOUS PRN
Status: DISCONTINUED | OUTPATIENT
Start: 2023-01-10 | End: 2023-01-10 | Stop reason: SDUPTHER

## 2023-01-10 RX ORDER — SODIUM CHLORIDE 0.9 % (FLUSH) 0.9 %
5-40 SYRINGE (ML) INJECTION EVERY 12 HOURS SCHEDULED
Status: DISCONTINUED | OUTPATIENT
Start: 2023-01-10 | End: 2023-01-14 | Stop reason: HOSPADM

## 2023-01-10 RX ORDER — MIDAZOLAM HYDROCHLORIDE 1 MG/ML
INJECTION INTRAMUSCULAR; INTRAVENOUS PRN
Status: DISCONTINUED | OUTPATIENT
Start: 2023-01-10 | End: 2023-01-10 | Stop reason: SDUPTHER

## 2023-01-10 RX ORDER — SODIUM CHLORIDE 9 MG/ML
INJECTION, SOLUTION INTRAVENOUS PRN
Status: DISCONTINUED | OUTPATIENT
Start: 2023-01-10 | End: 2023-01-14 | Stop reason: HOSPADM

## 2023-01-10 RX ORDER — BUPIVACAINE HYDROCHLORIDE AND EPINEPHRINE 5; 5 MG/ML; UG/ML
INJECTION, SOLUTION EPIDURAL; INTRACAUDAL; PERINEURAL PRN
Status: DISCONTINUED | OUTPATIENT
Start: 2023-01-10 | End: 2023-01-10 | Stop reason: ALTCHOICE

## 2023-01-10 RX ORDER — FENTANYL CITRATE 50 UG/ML
INJECTION, SOLUTION INTRAMUSCULAR; INTRAVENOUS PRN
Status: DISCONTINUED | OUTPATIENT
Start: 2023-01-10 | End: 2023-01-10 | Stop reason: SDUPTHER

## 2023-01-10 RX ORDER — DEXAMETHASONE SODIUM PHOSPHATE 10 MG/ML
INJECTION INTRAMUSCULAR; INTRAVENOUS PRN
Status: DISCONTINUED | OUTPATIENT
Start: 2023-01-10 | End: 2023-01-10 | Stop reason: SDUPTHER

## 2023-01-10 RX ORDER — LIDOCAINE HYDROCHLORIDE 20 MG/ML
INJECTION, SOLUTION INTRAVENOUS PRN
Status: DISCONTINUED | OUTPATIENT
Start: 2023-01-10 | End: 2023-01-10 | Stop reason: SDUPTHER

## 2023-01-10 RX ORDER — ACETAMINOPHEN 325 MG/1
650 TABLET ORAL EVERY 6 HOURS
Status: DISCONTINUED | OUTPATIENT
Start: 2023-01-10 | End: 2023-01-14 | Stop reason: HOSPADM

## 2023-01-10 RX ORDER — SODIUM CHLORIDE 9 MG/ML
INJECTION, SOLUTION INTRAVENOUS PRN
Status: DISCONTINUED | OUTPATIENT
Start: 2023-01-10 | End: 2023-01-10 | Stop reason: HOSPADM

## 2023-01-10 RX ORDER — ENOXAPARIN SODIUM 100 MG/ML
30 INJECTION SUBCUTANEOUS DAILY
Status: DISCONTINUED | OUTPATIENT
Start: 2023-01-10 | End: 2023-01-14 | Stop reason: HOSPADM

## 2023-01-10 RX ORDER — SENNA AND DOCUSATE SODIUM 50; 8.6 MG/1; MG/1
1 TABLET, FILM COATED ORAL 2 TIMES DAILY
Status: DISCONTINUED | OUTPATIENT
Start: 2023-01-11 | End: 2023-01-14 | Stop reason: HOSPADM

## 2023-01-10 RX ORDER — SODIUM CHLORIDE 9 MG/ML
INJECTION, SOLUTION INTRAVENOUS CONTINUOUS PRN
Status: DISCONTINUED | OUTPATIENT
Start: 2023-01-10 | End: 2023-01-10 | Stop reason: SDUPTHER

## 2023-01-10 RX ORDER — ONDANSETRON 2 MG/ML
4 INJECTION INTRAMUSCULAR; INTRAVENOUS EVERY 6 HOURS PRN
Status: DISCONTINUED | OUTPATIENT
Start: 2023-01-10 | End: 2023-01-14 | Stop reason: HOSPADM

## 2023-01-10 RX ORDER — IPRATROPIUM BROMIDE AND ALBUTEROL SULFATE 2.5; .5 MG/3ML; MG/3ML
1 SOLUTION RESPIRATORY (INHALATION)
Status: DISCONTINUED | OUTPATIENT
Start: 2023-01-10 | End: 2023-01-14 | Stop reason: HOSPADM

## 2023-01-10 RX ORDER — SODIUM CHLORIDE 0.9 % (FLUSH) 0.9 %
5-40 SYRINGE (ML) INJECTION PRN
Status: DISCONTINUED | OUTPATIENT
Start: 2023-01-10 | End: 2023-01-14 | Stop reason: HOSPADM

## 2023-01-10 RX ORDER — OXYCODONE HYDROCHLORIDE 5 MG/1
5 TABLET ORAL EVERY 4 HOURS PRN
Status: DISCONTINUED | OUTPATIENT
Start: 2023-01-10 | End: 2023-01-10

## 2023-01-10 RX ORDER — ONDANSETRON 2 MG/ML
4 INJECTION INTRAMUSCULAR; INTRAVENOUS
Status: DISCONTINUED | OUTPATIENT
Start: 2023-01-10 | End: 2023-01-10 | Stop reason: HOSPADM

## 2023-01-10 RX ORDER — OXYCODONE HYDROCHLORIDE AND ACETAMINOPHEN 5; 325 MG/1; MG/1
1 TABLET ORAL EVERY 4 HOURS PRN
Status: DISCONTINUED | OUTPATIENT
Start: 2023-01-10 | End: 2023-01-14 | Stop reason: HOSPADM

## 2023-01-10 RX ORDER — ROCURONIUM BROMIDE 10 MG/ML
INJECTION, SOLUTION INTRAVENOUS PRN
Status: DISCONTINUED | OUTPATIENT
Start: 2023-01-10 | End: 2023-01-10 | Stop reason: SDUPTHER

## 2023-01-10 RX ORDER — SODIUM CHLORIDE 0.9 % (FLUSH) 0.9 %
5-40 SYRINGE (ML) INJECTION EVERY 12 HOURS SCHEDULED
Status: DISCONTINUED | OUTPATIENT
Start: 2023-01-10 | End: 2023-01-10 | Stop reason: HOSPADM

## 2023-01-10 RX ORDER — PROPOFOL 10 MG/ML
INJECTION, EMULSION INTRAVENOUS PRN
Status: DISCONTINUED | OUTPATIENT
Start: 2023-01-10 | End: 2023-01-10 | Stop reason: SDUPTHER

## 2023-01-10 RX ORDER — SODIUM CHLORIDE 0.9 % (FLUSH) 0.9 %
5-40 SYRINGE (ML) INJECTION PRN
Status: DISCONTINUED | OUTPATIENT
Start: 2023-01-10 | End: 2023-01-10 | Stop reason: HOSPADM

## 2023-01-10 RX ORDER — ONDANSETRON 2 MG/ML
INJECTION INTRAMUSCULAR; INTRAVENOUS PRN
Status: DISCONTINUED | OUTPATIENT
Start: 2023-01-10 | End: 2023-01-10 | Stop reason: SDUPTHER

## 2023-01-10 RX ORDER — MEPERIDINE HYDROCHLORIDE 25 MG/ML
12.5 INJECTION INTRAMUSCULAR; INTRAVENOUS; SUBCUTANEOUS
Status: DISCONTINUED | OUTPATIENT
Start: 2023-01-10 | End: 2023-01-10 | Stop reason: HOSPADM

## 2023-01-10 RX ADMIN — SODIUM CHLORIDE: 9 INJECTION, SOLUTION INTRAVENOUS at 05:02

## 2023-01-10 RX ADMIN — HYDROMORPHONE HYDROCHLORIDE 0.5 MG: 1 INJECTION, SOLUTION INTRAMUSCULAR; INTRAVENOUS; SUBCUTANEOUS at 14:40

## 2023-01-10 RX ADMIN — ENOXAPARIN SODIUM 30 MG: 100 INJECTION SUBCUTANEOUS at 18:17

## 2023-01-10 RX ADMIN — QUETIAPINE FUMARATE 100 MG: 100 TABLET ORAL at 18:17

## 2023-01-10 RX ADMIN — OXYCODONE AND ACETAMINOPHEN 1 TABLET: 5; 325 TABLET ORAL at 18:17

## 2023-01-10 RX ADMIN — OXYCODONE AND ACETAMINOPHEN 1 TABLET: 5; 325 TABLET ORAL at 02:00

## 2023-01-10 RX ADMIN — FENTANYL CITRATE 100 MCG: 50 INJECTION, SOLUTION INTRAMUSCULAR; INTRAVENOUS at 12:23

## 2023-01-10 RX ADMIN — BUPROPION HYDROCHLORIDE 300 MG: 300 TABLET, FILM COATED, EXTENDED RELEASE ORAL at 08:20

## 2023-01-10 RX ADMIN — ROCURONIUM BROMIDE 10 MG: 10 INJECTION, SOLUTION INTRAVENOUS at 13:21

## 2023-01-10 RX ADMIN — IPRATROPIUM BROMIDE AND ALBUTEROL SULFATE 1 AMPULE: 2.5; .5 SOLUTION RESPIRATORY (INHALATION) at 21:15

## 2023-01-10 RX ADMIN — SODIUM CHLORIDE, PRESERVATIVE FREE 10 ML: 5 INJECTION INTRAVENOUS at 21:06

## 2023-01-10 RX ADMIN — MUPIROCIN: 20 OINTMENT TOPICAL at 23:22

## 2023-01-10 RX ADMIN — DEXAMETHASONE SODIUM PHOSPHATE 10 MG: 10 INJECTION INTRAMUSCULAR; INTRAVENOUS at 12:44

## 2023-01-10 RX ADMIN — OXYCODONE AND ACETAMINOPHEN 1 TABLET: 5; 325 TABLET ORAL at 23:22

## 2023-01-10 RX ADMIN — HYDROMORPHONE HYDROCHLORIDE 0.5 MG: 1 INJECTION, SOLUTION INTRAMUSCULAR; INTRAVENOUS; SUBCUTANEOUS at 14:47

## 2023-01-10 RX ADMIN — SUGAMMADEX 169 MG: 100 INJECTION, SOLUTION INTRAVENOUS at 14:01

## 2023-01-10 RX ADMIN — ACETAMINOPHEN 650 MG: 325 TABLET ORAL at 16:58

## 2023-01-10 RX ADMIN — MIDAZOLAM 2 MG: 1 INJECTION INTRAMUSCULAR; INTRAVENOUS at 12:18

## 2023-01-10 RX ADMIN — PIPERACILLIN AND TAZOBACTAM 4500 MG: 4; .5 INJECTION, POWDER, LYOPHILIZED, FOR SOLUTION INTRAVENOUS at 17:04

## 2023-01-10 RX ADMIN — PROPOFOL 200 MG: 10 INJECTION, EMULSION INTRAVENOUS at 12:23

## 2023-01-10 RX ADMIN — LIDOCAINE HYDROCHLORIDE 100 MG: 20 INJECTION, SOLUTION INTRAVENOUS at 12:23

## 2023-01-10 RX ADMIN — CEFAZOLIN 2 G: 1 INJECTION, POWDER, FOR SOLUTION INTRAMUSCULAR; INTRAVENOUS at 12:37

## 2023-01-10 RX ADMIN — ACETAMINOPHEN 650 MG: 325 TABLET ORAL at 21:05

## 2023-01-10 RX ADMIN — ROCURONIUM BROMIDE 50 MG: 10 INJECTION, SOLUTION INTRAVENOUS at 12:23

## 2023-01-10 RX ADMIN — OXYCODONE AND ACETAMINOPHEN 1 TABLET: 5; 325 TABLET ORAL at 06:29

## 2023-01-10 RX ADMIN — FENTANYL CITRATE 100 MCG: 50 INJECTION, SOLUTION INTRAMUSCULAR; INTRAVENOUS at 13:02

## 2023-01-10 RX ADMIN — ONDANSETRON 4 MG: 2 INJECTION INTRAMUSCULAR; INTRAVENOUS at 12:44

## 2023-01-10 RX ADMIN — FENTANYL CITRATE 50 MCG: 50 INJECTION, SOLUTION INTRAMUSCULAR; INTRAVENOUS at 12:50

## 2023-01-10 RX ADMIN — SODIUM CHLORIDE: 9 INJECTION, SOLUTION INTRAVENOUS at 12:08

## 2023-01-10 ASSESSMENT — PAIN SCALES - GENERAL
PAINLEVEL_OUTOF10: 0
PAINLEVEL_OUTOF10: 7
PAINLEVEL_OUTOF10: 4
PAINLEVEL_OUTOF10: 4
PAINLEVEL_OUTOF10: 5
PAINLEVEL_OUTOF10: 8
PAINLEVEL_OUTOF10: 8
PAINLEVEL_OUTOF10: 0
PAINLEVEL_OUTOF10: 5

## 2023-01-10 ASSESSMENT — PAIN DESCRIPTION - DESCRIPTORS
DESCRIPTORS: ACHING;DISCOMFORT;SORE
DESCRIPTORS: ACHING;DISCOMFORT;THROBBING
DESCRIPTORS: ACHING;DISCOMFORT;TENDER
DESCRIPTORS: DISCOMFORT
DESCRIPTORS: ACHING;DISCOMFORT
DESCRIPTORS: ACHING;DISCOMFORT

## 2023-01-10 ASSESSMENT — PAIN DESCRIPTION - LOCATION
LOCATION: CHEST;RIB CAGE
LOCATION: CHEST
LOCATION: CHEST;RIB CAGE
LOCATION: CHEST

## 2023-01-10 ASSESSMENT — PAIN DESCRIPTION - ORIENTATION
ORIENTATION: LEFT

## 2023-01-10 ASSESSMENT — PAIN DESCRIPTION - FREQUENCY: FREQUENCY: INTERMITTENT

## 2023-01-10 ASSESSMENT — PAIN DESCRIPTION - ONSET: ONSET: ON-GOING

## 2023-01-10 ASSESSMENT — PAIN - FUNCTIONAL ASSESSMENT
PAIN_FUNCTIONAL_ASSESSMENT: ACTIVITIES ARE NOT PREVENTED
PAIN_FUNCTIONAL_ASSESSMENT: PREVENTS OR INTERFERES SOME ACTIVE ACTIVITIES AND ADLS
PAIN_FUNCTIONAL_ASSESSMENT: PREVENTS OR INTERFERES SOME ACTIVE ACTIVITIES AND ADLS

## 2023-01-10 ASSESSMENT — PAIN DESCRIPTION - PAIN TYPE: TYPE: ACUTE PAIN;SURGICAL PAIN

## 2023-01-10 NOTE — PROGRESS NOTES
Hospitalist Progress Note      Synopsis: Patient admitted on 1/7/2023 Mr. Cesar Umanzor, a 50y.o. year old male  who  has a past medical history of Bipolar 1 disorder ([facility] [address] presents with one month of intermittent cough and SOB, associated with left chest pain more upon coughing associated with low grade fever. Has sputum production but clear. [place] left chest pain, no relieving factors, denies nausea or vomiting no abdominal pain or diarrhea no lightheadedness or tingling. . Underwent thoracentesis which he tolerated well. Concern for complicated empyema. Thoracic surgery consulted. ECHO ordered and shows EF of 55%, indeterminate diastolic function. No obvious valvular abnormalities. Patient with repeat CT that shows moderate left pleural fluid collection with partially loculated appearance. Thoracic surgery consulted and patient for VATS today. Patient also with NEGRO. Vancomycin discontinued. Continue Zosyn renally adjusted. Start IVF. Patient received contrast administration, Lasix, and vancomycin. Nephrology consulted. Subjective    Feels better today. Percocet make him  much less nauseated. Anxious for OR today. No chest pain and breathing feels about the same. He is urinating without change or difficulty. Exam:  BP (!) 159/99   Pulse 90   Temp 97.9 °F (36.6 °C) (Temporal)   Resp 18   Ht 5' 9\" (1.753 m)   Wt 186 lb 6.4 oz (84.6 kg)   SpO2 95%   BMI 27.53 kg/m²   -General:  Awake, alert, oriented X 3. Well developed, well nourished, well groomed. No apparent distress. -HEENT:  Normocephalic, atraumatic. Pupils equal, round, reactive to light. No scleral icterus. No conjunctival injection. Normal lips, teeth, and gums. No nasal discharge. Neck:  Supple    -Heart:  RRR, no murmurs, gallops, rubs    -Lungs:  CTA bilaterally, bilat symmetrical expansion, no wheeze, rales, or rhonchi    -Abdomen:   Bowel sounds present, soft, nontender, no masses, no organomegaly, no peritoneal signs    -Extremities: normal ROM. No Cyanosis clubbing or edema    -Skin: Warm and dry    -Neuro:  AAO x 3 . Cranial nerves 2-12 intact, no focal deficits     -Psych : normal .    Medications:  Reviewed    Infusion Medications    sodium chloride 100 mL/hr at 01/10/23 0502    sodium chloride       Scheduled Medications    ceFAZolin  2,000 mg IntraVENous On Call to OR    piperacillin-tazobactam  3,375 mg IntraVENous Q8H    buPROPion  300 mg Oral Daily    QUEtiapine  100 mg Oral QPM    sodium chloride flush  10 mL IntraVENous 2 times per day    [Held by provider] enoxaparin  40 mg SubCUTAneous Daily     PRN Meds: oxyCODONE-acetaminophen, medicated lip balm, sodium chloride flush, sodium chloride, ondansetron **OR** ondansetron, magnesium hydroxide, acetaminophen **OR** acetaminophen    I/O    Intake/Output Summary (Last 24 hours) at 1/10/2023 1346  Last data filed at 1/9/2023 2359  Gross per 24 hour   Intake --   Output 800 ml   Net -800 ml       Labs:   Recent Labs     01/08/23  0619 01/09/23  0631 01/10/23  0615   WBC 13.1* 11.7* 8.4   HGB 12.4* 12.6 12.6   HCT 36.6* 37.4 37.2    344 372       Recent Labs     01/09/23  2105 01/10/23  0615 01/10/23  0756    138 136   K 4.2 4.1 4.2   CL 98 102 103   CO2 17* 20* 18*   BUN 24* 26* 26*   CREATININE 3.1* 3.5* 3.4*   CALCIUM 8.8 8.9 8.5*       Recent Labs     01/08/23  0619 01/09/23  0631 01/10/23  0615   PROT 7.5 7.1 7.1   ALKPHOS 117 120 118   ALT 19 19 17   AST 14 15 14   BILITOT 0.8 0.9 0.7       Recent Labs     01/09/23  1914   INR 1.3       No results for input(s): Romero Blake in the last 72 hours. Chronic labs:  Lab Results   Component Value Date    INR 1.3 01/09/2023       Radiology:  Imaging studies reviewed today       Assessment/PLAN:    Empyema  NEGRO  Depression    Status post thoracentesis 1/8 ,600 cc out. Echo reviewed.  EF 55%, no sig valve disease, and indeterminate diastolic function   MRSA swab pending  On Zosyn monotherapy  Personally D/W Dr. Kamla Sim from [facility]-patient for VATS today  IVF   Nephrology consultation given worsening renal failure-patient with good urine output -likely multi-factoriaI did stop Lasix and Vancomycin was stopped. Continue home dose of seroquel and wellbutrin. Diet: Diet NPO Exceptions are: Sips of Water with Meds  Code Status: Full Code      +++++++++++++++++++++++++++++++++++++++++++++++++  Cristina Barakat DO   1/10/2023  [facility].  +++++++++++++++++++++++++++++++++++++++++++++++++  NOTE: This report was transcribed using voice recognition software. Every effort was made to ensure accuracy; however, inadvertent computerized transcription errors may be present.

## 2023-01-10 NOTE — OP NOTE
Operative Note      Patient: Cheyenne Melendez  YOB: 1975  MRN: [id number]    Date of Procedure: 1/10/2023    Pre-Op Diagnosis: Empyema ([address] [J86.9]    Post-Op Diagnosis: Same       Procedure(s):  BRONCHOSCOPY   LEFT VIDEO ASSISTED THORACOSCOPY  DECORTICATION    Surgeon(s):  Jose Portillo DO    Assistant:   Physician Assistant: SG Nash    Anesthesia: General    Estimated Blood Loss (mL): less than 251     Complications: None    Specimens:   ID Type Source Tests Collected by Time Destination   1 : PLEURAL FLUID Tissue Lung CULTURE, ANAEROBIC, CULTURE, FUNGUS, GRAM STAIN, CULTURE, SURGICAL, CULTURE WITH SMEAR, ACID FAST BACILLIUS Wen Henry EVENSOLASHAYNE, DO 1/10/2023 1301    2 : PLEURAL PEEL Tissue Lung CULTURE, ANAEROBIC, CULTURE, FUNGUS, GRAM STAIN, CULTURE, SURGICAL, CULTURE WITH SMEAR, ACID FAST BACILLIUS Wen Henry EVENSOLACARMELA, DO 1/10/2023 1307        Implants:  * No implants in log *      Drains:   Chest Tube Left Pleural 1 (Active)   Status Continuous Suction 01/10/23 1356   Suction -20 cm H2O 01/10/23 1356   Dressing Status New dressing applied;Clean, dry & intact 01/10/23 1356   Chest Tube Dressing Other (Comment) 01/10/23 1356       Chest Tube Left Pleural 2 (Active)   Status Continuous Suction 01/10/23 1356   Suction -20 cm H2O 01/10/23 1356   Dressing Status New dressing applied;Clean, dry & intact 01/10/23 1356   Chest Tube Dressing Other (Comment) 01/10/23 1356       Findings: 48yo M with PMH bipolar disorder presented to ED 1/7/2023 with 1 month history of intermittent cough, SOB and chest pain. He was found to have left pleural effusion. He underwent thoracentesis which failed to resolve the fluid collection. CTS was consulted for possible VATS. Intraoperatively, he was found to have extensive fibropurulent exudate throughout the entire left chest, worst inferolaterally. All exudate was removed. Decortication was performed.  The lung expanded well at the conclusion of the case.    Detailed Description of Procedure:   After appropriate discussion of risks, benefits and alternatives, informed consent was obtained. Patient was brought to the operating room in stable condition. He was transferred to the operating table in the supine position. General anesthesia was provided by the anesthesia department and he was intubated with a double lumen endotracheal tube. Positioning was confirmed with bronchoscopy. SCDs were on and functioning. Preoperative antibiotics were given. Patient was moved into the right lateral decubitus position. All pressure points were padded appropriately and the endotracheal tube positioning was again confirmed. The left chest was prepped and draped in the standard fashion. Time out was called for confirming appropriate patient, positioning and procedure. A 5cm access incision was created in the 5th intercostal space in the anterior axillary line. The subcutaneous tissue was divided to the level of the pleura. The pleura was entered under direct visualization. There was immediate return of pita fluid. Specimen was sent for culture. A small Delroy wound retractor was placed and the left hemithorax was visualized. The left lung was densely adherent to the chest wall. Using gentle retraction, the lung was carefully freed from the chest wall. There was significant amount of fibropurulent exudate. Once the lung was freed from the chest wall, a stab incision was created in the 7th intercostal space in the posterior axillary line. A 5mm port was placed. The camera was placed through this port. Through the access incision, decortication was performed. The entire chest wall and lung parenchyma was decorticated. The fibrinous debris was removed. Once adequate, the left chest was instilled with warm normal saline. The effluent was suctioned free. Two chest tubes, one angled and one straight were placed and secured in place.  The left lung was allowed to inflate with gentle Valsalva. The lung had good approximation to the chest wall. The wound retractor was removed and the access incision was closed in multiple layers. Marcaine was instilled into the incision and chest tube sites to improve post-operative pain control. At the conclusion of the case, all needle, sponge and instrument counts were correct. The patient was awaken from anesthesia, extubated and transferred to the PACU in stable condition.       Electronically signed by Yovana Wills DO on 1/10/2023 at 2:10 PM

## 2023-01-10 NOTE — PROGRESS NOTES
Pharmacy Note - Renal dose adjustment made per P/T protocol    Original order:  Cefazolin 2000 mg every 8 hours x3 doses for surgical prophylaxis    Estimated Creatinine Clearance: 27 mL/min (A) (based on SCr of 3.3 mg/dL (H)). Recent Labs     01/09/23  1914 01/09/23  2105 01/10/23  0615 01/10/23  0756 01/10/23  1420   BUN 22*   < > 26* 26* 26*   CREATININE 3.0*   < > 3.5* 3.4* 3.3*   PLT  --   --  372  --  384   INR 1.3  --   --   --   --     < > = values in this interval not displayed. Renally adjusted order:  Cefazolin 2000 mg every 12 hours x2 doses    Please call pharmacy with any questions.     Thank you,  Alex Duarte, [facility]  1/10/2023 4:04 PM Rhomboid Transposition Flap Text: The defect edges were debeveled with a #15 scalpel blade.  Given the location of the defect and the proximity to free margins a rhomboid transposition flap was deemed most appropriate.  Using a sterile surgical marker, an appropriate rhomboid flap was drawn incorporating the defect.    The area thus outlined was incised deep to adipose tissue with a #15 scalpel blade.  The skin margins were undermined to an appropriate distance in all directions utilizing iris scissors.

## 2023-01-10 NOTE — PROGRESS NOTES
CTS PACU Progress Note:      Brief HPI: Awake, alert. Patient complains of pain. He was just given dilaudid upon my arrival. Denies CP, palpitations, SOB at rest, dizziness/lightheadedness. Vitals:    01/10/23 1440 01/10/23 1445 01/10/23 1447 01/10/23 1500   BP: (!) 144/77 (!) 147/79 133/72 139/73   Pulse:  100 (!) 101 93   Resp: 20 16 20 16   Temp:       TempSrc:       SpO2:  99% 96%    Weight:       Height:               Intake/Output Summary (Last 24 hours) at 1/10/2023 1509  Last data filed at 1/10/2023 1421  Gross per 24 hour   Intake 600 ml   Output 800 ml   Net -200 ml       CURRENT CT output:   Pleural: 130mL     Recent Labs     01/09/23  0631 01/10/23  0615   WBC 11.7* 8.4   HGB 12.6 12.6   HCT 37.4 37.2    372     Recent Labs     01/10/23  0615 01/10/23  0756   BUN 26* 26*   CREATININE 3.5* 3.4*           PE  Cardiac: RRR  Lungs: decreased bases  Chest incision with DSD C/D/I. Chest tubes x 2 present and secure. Abd: Soft, nontender, +BS  Ext: TENORIO        A/P: Day of Surgery     Stable s/p left VATS with decortication  Waiting on post-op labs - will review  Maintaining NSR  VSS  CXR reviewed  CT drainage adequate for immediate post operative period--currently at 130mL.  No airleak      Dispo: continue transfer to PCCU      This patient's case and care plan was discussed with the attending surgeon

## 2023-01-10 NOTE — PROGRESS NOTES
Talat Hunt M.D.,[facility]  Kristine Rich D.O., F.A.C.O.I., Bala Farley M.D. Indiana Bernstein M.D. Lula Maharaj D.O. Daily Pulmonary Progress Note    Patient:  Mehdi Sauceda 50 y.o. male MRN: [id number]     Date of Service: 1/10/2023      Synopsis     We are following patient for pleural effusion    \"CC\" shortness of breath    Code status: Full      Subjective      Patient was seen and examined. Await results of echo completed 1/9/23. Continues with left sided chest discomfort. Ct chest reviewed. For L VATS today. IV fluids for NEGRO per primary team. No fevers overnight. Review of Systems:  Constitutional: Denies fever, weight loss, night sweats, and fatigue  Skin: Denies pigmentation, dark lesions, and rashes   HEENT: Denies hearing loss, tinnitus, ear drainage, epistaxis, sore throat, and hoarseness. Cardiovascular: Denies palpitations, chest pain, and chest pressure.   Respiratory:  left sided chest pleuritic pain, no sputum  Gastrointestinal: Denies nausea, vomiting, poor appetite, diarrhea, heartburn or reflux  Genitourinary: Denies dysuria, frequency, urgency or hematuria  Musculoskeletal: Denies myalgias, muscle weakness, and bone pain  Neurological: Denies dizziness, vertigo, headache, and focal weakness  Psychological: Denies anxiety and depression  Endocrine: Denies heat intolerance and cold intolerance  Hematopoietic/Lymphatic: Denies bleeding problems and blood transfusions    24-hour events:  Ct chest repeated without contrast    Objective   Vitals: BP (!) 159/99   Pulse 90   Temp 97.9 °F (36.6 °C) (Temporal)   Resp 18   Ht 5' 9\" (1.753 m)   Wt 186 lb 6.4 oz (84.6 kg)   SpO2 95%   BMI 27.53 kg/m²     I/O:    Intake/Output Summary (Last 24 hours) at 1/10/2023 0965  Last data filed at 1/9/2023 2359  Gross per 24 hour   Intake --   Output 800 ml   Net -800 ml                          CURRENT MEDS :  Scheduled Meds:   ceFAZolin  2,000 mg IntraVENous On Call to OR buPROPion  300 mg Oral Daily    vancomycin (VANCOCIN) intermittent dosing (placeholder)   Other RX Placeholder    QUEtiapine  100 mg Oral QPM    sodium chloride flush  10 mL IntraVENous 2 times per day    [Held by provider] enoxaparin  40 mg SubCUTAneous Daily       Physical Exam:  General Appearance: appears comfortable in no acute distress. HEENT: Normocephalic atraumatic without obvious abnormality   Neck: Lips, mucosa, and tongue normal.  Supple, symmetrical, trachea midline, no adenopathy;thyroid:  no enlargement/tenderness/nodules or JVD. Lung: Breath sounds slightly diminished left base respirations   unlabored. Symmetrical expansion. Heart: RRR, normal S1, S2. No MRG  Abdomen: Soft, NT, ND. BS present x 4 quadrants. No bruit or organomegaly. Extremities: Pedal pulses 2+ symmetric b/l. Extremities normal, no cyanosis, clubbing, or edema. Musculokeletal: No joint swelling, no muscle tenderness. ROM normal in all joints of extremities. Neurologic: Mental status: Alert and Oriented X3 . Pertinent/ New Labs and Imaging Studies     Imaging Personally Reviewed:  Ct chest non contrast 1/9/23  Moderate left pleural fluid collection with a partially loculated appearance   limited for empyema evaluation due to lack of intravenous contrast without   gross pleural wall thickening evident similar to prior comparison from   01/07/2023 with adjacent atelectasis. No new or separate consolidation. 1/9/2023 chest x-ray  FINDINGS:   Cardiac size remains enlarged with opacifications at the left lung base of   moderate left pleural effusion and adjacent atelectasis. No pneumothorax. Degenerate changes of the spine. Impression   Stable moderate volume left pleural effusion with adjacent atelectasis                 CTA chest on 1/7/23  Impression   1. No indication for acute pulmonary emboli. 2.  No aneurysm formation or dissection of the thoracic aorta.        3.  Moderate to large size left-sided pleural effusion causes significant   compression atelectasis of the left lower lobe with patent central airways. RECOMMENDATIONS:   Unavailable         ECHO  1/9/2023-pending    Labs:  Lab Results   Component Value Date/Time    WBC 8.4 01/10/2023 06:15 AM    HGB 12.6 01/10/2023 06:15 AM    HCT 37.2 01/10/2023 06:15 AM    MCV 82.1 01/10/2023 06:15 AM    MCH 27.8 01/10/2023 06:15 AM    MCHC 33.9 01/10/2023 06:15 AM    RDW 12.9 01/10/2023 06:15 AM     01/10/2023 06:15 AM    MPV 9.6 01/10/2023 06:15 AM     Lab Results   Component Value Date/Time     01/10/2023 07:56 AM    K 4.2 01/10/2023 07:56 AM    K 4.2 01/09/2023 09:05 PM     01/10/2023 07:56 AM    CO2 18 01/10/2023 07:56 AM    BUN 26 01/10/2023 07:56 AM    CREATININE 3.4 01/10/2023 07:56 AM    LABALBU 3.3 01/10/2023 06:15 AM    CALCIUM 8.5 01/10/2023 07:56 AM    LABGLOM 21 01/10/2023 07:56 AM     Lab Results   Component Value Date/Time    PROTIME 14.6 01/09/2023 07:14 PM    INR 1.3 01/09/2023 07:14 PM     Recent Labs     01/07/23  1317   PROBNP 15       Recent Labs     01/09/23  0639   PROCAL 0.45*       This SmartLink has not been configured with any valid records. Micro:  No results for input(s): CULTRESP in the last 72 hours. Recent Labs     01/08/23  0830   LABGRAM Refer to ordered Gram stain for results       No results for input(s): LEGUR in the last 72 hours. No results for input(s): STREPNEUMAGU in the last 72 hours. No results for input(s): LP1UAG in the last 72 hours.     Pleural fluid studies consistent with exudate    Latest Reference Range & Units 1/8/23 08:30 1/8/23 09:11   Source + CELL CT/DIFF-BF  Pleural    Appearance, Fluid  Cloudy    Color, Fluid  Pink    Fluid Type  Pleural    Glucose, Fluid Not Established mg/dL 91    LD, Fluid Not Established U/L 448    RBC, Fluid /uL 17,000    Monocyte Count, Fluid % 41 (C)    Neutrophil Count, Fluid % 59    Nucl Cell, Fluid /uL 7,014 (C)    pH, Fluid 7.266   Protein, Fluid Not Established g/dL 5.4    (C): Corrected  Pleural fluid culture 1/8/2023  Component 1/8/23 0830    Gram Stain Orderable Gram stain performed on unspun fluid   Rare Polymorphonuclear leukocytes   Epithelial cells not seen   No organisms seen    1/8 cytology pleural fluid pending     Assessment:    Moderate to large sized left pleural effusion with compressive atelectasis  Recent URI 1 month ago, recent dental procedure  Left thoracentesis 1/8/2023 fluid consistent with exudate, possible empyema vs parapneumonic effusion  Community-acquired pneumonia  Fevers  Left-sided pleuritic chest pain  Bipolar 1 disorder  Volume contraction, mild NEGRO     Plan:   Continue antibiotics Zosyn and vancomycin. Await final fluid studies, culture and cytology pending. MRSA nasal swab pending  Repeat non contrast CT chest 1/9/2023  CT surgery for  L VATS today  Monitor off oxygen keep SPO2 greater than 92%  Off lasix, IV fluids per primary for mild NEGRO. Monitor creat  2D echo completed 1/9/23  Analgesics as needed for a left sided pleuritic chest pain  DVT, GI prophylaxis      This plan of care was reviewed in collaboration with Dr. Kamla Sim   Electronically signed by LYN Thompson CNP on 1/10/2023 at 9:21 AM         I personally saw, examined, and cared for the patient. Labs, medications, radiographs reviewed. I agree with history exam and plans detailed in NP note. VATS today given loculated complex pleural effusion.     Pipe Farmer DO

## 2023-01-10 NOTE — PROGRESS NOTES
Hospitalist Progress Note      Synopsis: Patient admitted on 1/7/2023 Mr. Sergey Milligan, a 50y.o. year old male  who  has a past medical history of Bipolar 1 disorder ([address] presents with one month of intermittent cough and SOB, associated with left chest pain more upon coughing associated with low grade fever. Has sputum production but clear. [place] left chest pain, no relieving factors, denies nausea or vomiting no abdominal pain or diarrhea no lightheadedness or tingling. . Underwent thoracentesis which he tolerated well. Concern for complicated empyema. Thoracic surgery consulted. ECHO ordered and pending. Subjective  Feels okay today  Just tired  Still slightly short of breath. Would rather try percocet rather than oxycodone. No abdominal pain. Exam:  /86   Pulse 89   Temp 99 °F (37.2 °C) (Temporal)   Resp 17   Wt 184 lb 12.8 oz (83.8 kg)   SpO2 94%   BMI 27.29 kg/m²   -General:  Awake, alert, oriented X 3. Well developed, well nourished, well groomed. No apparent distress. -HEENT:  Normocephalic, atraumatic. Pupils equal, round, reactive to light. No scleral icterus. No conjunctival injection. Normal lips, teeth, and gums. No nasal discharge. Neck:  Supple    -Heart:  RRR, no murmurs, gallops, rubs    -Lungs:  CTA bilaterally, bilat symmetrical expansion, no wheeze, rales, or rhonchi    -Abdomen: Bowel sounds present, soft, nontender, no masses, no organomegaly, no peritoneal signs    -Extremities: normal ROM. No Cyanosis clubbing or edema    -Skin: Warm and dry    -Neuro:  AAO x 3 . Cranial nerves 2-12 intact, no focal deficits     -Psych : normal .    Medications:  Reviewed    Infusion Medications    sodium chloride       Scheduled Medications    [START ON 1/10/2023] buPROPion  300 mg Oral Daily    furosemide  20 mg Oral Daily    piperacillin-tazobactam  4,500 mg IntraVENous Q8H    vancomycin  1,000 mg IntraVENous Q12H    QUEtiapine  100 mg Oral QPM    sodium chloride flush  10 mL IntraVENous 2 times per day    [Held by provider] enoxaparin  40 mg SubCUTAneous Daily     PRN Meds: oxyCODONE-acetaminophen, medicated lip balm, sodium chloride flush, sodium chloride, ondansetron **OR** ondansetron, magnesium hydroxide, acetaminophen **OR** acetaminophen    I/O    Intake/Output Summary (Last 24 hours) at 1/9/2023 1912  Last data filed at 1/9/2023 0513  Gross per 24 hour   Intake 480 ml   Output --   Net 480 ml       Labs:   Recent Labs     01/07/23  1050 01/08/23  0619 01/09/23  0631   WBC 11.2 13.1* 11.7*   HGB 13.2 12.4* 12.6   HCT 39.0 36.6* 37.4   * 357 344       Recent Labs     01/07/23  1317 01/08/23  0619 01/09/23  0631    136 135   K 4.3 4.1 4.0    101 100   CO2 22 20* 19*   BUN 16 14 17   CREATININE 0.8 0.9 1.7*   CALCIUM 9.2 9.2 8.9       Recent Labs     01/07/23  1317 01/08/23  0619 01/09/23  0631   PROT 7.5 7.5 7.1   ALKPHOS 96 117 120   ALT 13 19 19   AST 10 14 15   BILITOT 0.6 0.8 0.9       No results for input(s): INR in the last 72 hours. No results for input(s): Cleatrice Garcia in the last 72 hours. Chronic labs:  No results found for: CHOL, TRIG, HDL, LDLCALC, TSH, PSA, INR, LABA1C    Radiology:  Imaging studies reviewed today. ASSESSMENT:    Principal Problem:    Pleural effusion on left  Active Problems:    Pleural effusion  Resolved Problems:    * No resolved hospital problems. *       Assessment/PLAN:    - left pleuritic chest pain  - left pleural effusion   - fever   -empyema     Plan:  -Status post thoracentesis 1/8 ,600 cc out. - serial troponin and check Echo  - empiric vanc/zosyn  - d/w pulm. Need thoracic surgery consult possibly empyema and need for VATS  - Oxygen supplement as needed   - pain control --switch to percocet   -ECHO pending   Home dose of wellbutrin incorrect. Will place on correct dose. Diet: ADULT DIET;  Regular  Diet NPO Exceptions are: Sips of Water with Meds  Code Status: Full Code      +++++++++++++++++++++++++++++++++++++++++++++++++  Lorenzo Rough DO   1/9/2023  [facility].  +++++++++++++++++++++++++++++++++++++++++++++++++  NOTE: This report was transcribed using voice recognition software. Every effort was made to ensure accuracy; however, inadvertent computerized transcription errors may be present.

## 2023-01-10 NOTE — ANESTHESIA PRE PROCEDURE
Department of Anesthesiology  Preprocedure Note       Name:  Deni Sosa   Age:  50 y.o.  :  1975                                          MRN:  [id number]         Date:  1/10/2023      Surgeon: Jak Rider):  Nola Yee DO    Procedure: Procedure(s):  BRONCHOSCOPY  BRONCHOSCOPY LEFT VIDEO ASSISTED THORACOSCOPY , DECORTICATION, POSSIBLE PLUERODESIS WANTS 9AM    Medications prior to admission:   Prior to Admission medications    Medication Sig Start Date End Date Taking? Authorizing Provider   clobetasol (TEMOVATE) 0.05 % ointment Apply topically 2 times daily. 18   Audelia Mode, DO   buPROPion (WELLBUTRIN) 100 MG tablet Take 200 mg by mouth every morning. Historical Provider, MD   QUEtiapine (SEROQUEL) 100 MG tablet Take 100 mg by mouth every evening.     Historical Provider, MD       Current medications:    Current Facility-Administered Medications   Medication Dose Route Frequency Provider Last Rate Last Admin    ceFAZolin (ANCEF) 2,000 mg in sterile water 20 mL IV syringe  2,000 mg IntraVENous On Call to [address] APRN - CNP        buPROPion (WELLBUTRIN XL) extended release tablet 300 mg  300 mg Oral Daily Lorenzo Rough, DO   300 mg at 01/10/23 0820    oxyCODONE-acetaminophen (PERCOCET) 5-325 MG per tablet 1 tablet  1 tablet Oral Q4H PRN Larimore Rough, DO   1 tablet at 01/10/23 0629    0.9 % sodium chloride infusion   IntraVENous Continuous Larimore Rough,  mL/hr at 01/10/23 0502 New Bag at 01/10/23 0502    medicated lip balm (BLISTEX/CARMEX) stick   Topical PRN Felisa Sandoval APRN - CNP   Given at 23 1622    QUEtiapine (SEROQUEL) tablet 100 mg  100 mg Oral QPM Fazalr Pipe Arnold MD   100 mg at 23 2048    sodium chloride flush 0.9 % injection 10 mL  10 mL IntraVENous 2 times per day Belen Olson MD   10 mL at 23 0832    sodium chloride flush 0.9 % injection 10 mL  10 mL IntraVENous PRN Tracy Arnold MD        0.9 % sodium chloride infusion   IntraVENous PRN Mane Delacruz MD        [Held by provider] enoxaparin (LOVENOX) injection 40 mg  40 mg SubCUTAneous Daily Samer Evelyn Matthew MD   40 mg at 01/09/23 1011    ondansetron (ZOFRAN-ODT) disintegrating tablet 4 mg  4 mg Oral Q8H PRN Samer Evelyn Matthew MD        Or    ondansetron [facility]) injection 4 mg  4 mg IntraVENous Q6H PRN Samer Evelyn Matthew MD   4 mg at 01/09/23 1631    magnesium hydroxide (MILK OF MAGNESIA) 400 MG/5ML suspension 30 mL  30 mL Oral Daily PRN Samer Evelyn Matthew MD        acetaminophen (TYLENOL) tablet 650 mg  650 mg Oral Q6H PRN Samer Evelyn Matthew MD   650 mg at 01/08/23 0026    Or    acetaminophen (TYLENOL) suppository 650 mg  650 mg Rectal Q6H PRN Samer Evelyn Matthew MD           Allergies: Allergies   Allergen Reactions    Sulfa Antibiotics        Problem List:    Patient Active Problem List   Diagnosis Code    Pleural effusion on left J90    Pleural effusion J90       Past Medical History:        Diagnosis Date    Bipolar 1 disorder ([facility] [address]        Past Surgical History:        Procedure Laterality Date    SHOULDER SURGERY         Social History:    Social History     Tobacco Use    Smoking status: Never    Smokeless tobacco: Not on file   Substance Use Topics    Alcohol use:  No                                Counseling given: Not Answered      Vital Signs (Current):   Vitals:    01/09/23 1956 01/09/23 2109 01/10/23 0600 01/10/23 0726   BP: 137/87   (!) 159/99   Pulse: (!) 103   90   Resp: 18   18   Temp: 98.4 °F (36.9 °C)   97.9 °F (36.6 °C)   TempSrc: Oral   Temporal   SpO2: 94%   95%   Weight:   186 lb 6.4 oz (84.6 kg)    Height:  5' 9\" (1.753 m)                                                BP Readings from Last 3 Encounters:   01/10/23 (!) 159/99   06/11/18 (!) 146/86       NPO Status:  > 8hr                                                                               BMI:   Wt Readings from Last 3 Encounters:   01/10/23 186 lb 6.4 oz (84.6 kg) 06/11/18 190 lb (86.2 kg)     Body mass index is 27.53 kg/m². CBC:   Lab Results   Component Value Date/Time    WBC 8.4 01/10/2023 06:15 AM    RBC 4.53 01/10/2023 06:15 AM    HGB 12.6 01/10/2023 06:15 AM    HCT 37.2 01/10/2023 06:15 AM    MCV 82.1 01/10/2023 06:15 AM    RDW 12.9 01/10/2023 06:15 AM     01/10/2023 06:15 AM       CMP:   Lab Results   Component Value Date/Time     01/10/2023 07:56 AM    K 4.2 01/10/2023 07:56 AM    K 4.2 01/09/2023 09:05 PM     01/10/2023 07:56 AM    CO2 18 01/10/2023 07:56 AM    BUN 26 01/10/2023 07:56 AM    CREATININE 3.4 01/10/2023 07:56 AM    LABGLOM 21 01/10/2023 07:56 AM    GLUCOSE 102 01/10/2023 07:56 AM    PROT 7.1 01/10/2023 06:15 AM    CALCIUM 8.5 01/10/2023 07:56 AM    BILITOT 0.7 01/10/2023 06:15 AM    ALKPHOS 118 01/10/2023 06:15 AM    AST 14 01/10/2023 06:15 AM    ALT 17 01/10/2023 06:15 AM       POC Tests: No results for input(s): POCGLU, POCNA, POCK, POCCL, POCBUN, POCHEMO, POCHCT in the last 72 hours.     Coags:   Lab Results   Component Value Date/Time    PROTIME 14.6 01/09/2023 07:14 PM    INR 1.3 01/09/2023 07:14 PM    APTT 35.7 01/09/2023 07:14 PM       HCG (If Applicable): No results found for: PREGTESTUR, PREGSERUM, HCG, HCGQUANT     ABGs: No results found for: PHART, PO2ART, MIY6LEI, LPR6LGO, BEART, S9WDMHBZ     Type & Screen (If Applicable):  No results found for: LABABO, LABRH    Drug/Infectious Status (If Applicable):  No results found for: HIV, HEPCAB    COVID-19 Screening (If Applicable):   Lab Results   Component Value Date/Time    COVID19 NOT DETECTED 01/07/2023 10:51 AM           Anesthesia Evaluation  Patient summary reviewed and Nursing notes reviewed no history of anesthetic complications:   Airway: Mallampati: II  TM distance: >3 FB   Neck ROM: full  Mouth opening: > = 3 FB   Dental:          Pulmonary:   (+) pneumonia: resolved,  decreased breath sounds                            Cardiovascular:  Exercise tolerance: good (>4 METS),           Rhythm: regular  Rate: normal                    Neuro/Psych:   (+) psychiatric history:            GI/Hepatic/Renal: Neg GI/Hepatic/Renal ROS            Endo/Other: Negative Endo/Other ROS                    Abdominal:             Vascular: negative vascular ROS. Other Findings:           Anesthesia Plan      general     ASA 3       Induction: intravenous. arterial line  MIPS: Prophylactic antiemetics administered. Anesthetic plan and risks discussed with patient. Plan discussed with CRNA.                     Herminio Lewis,    1/10/2023

## 2023-01-10 NOTE — PROGRESS NOTES
This patient is on medication that requires renal, weight, and/or indication dose adjustment. Date Body Weight IBW  Adjusted BW SCr  CrCl Dialysis status   1/9/2023 184 lb 12.8 oz (83.8 kg) Ideal body weight: 70.7 kg (155 lb 13.8 oz)  Adjusted ideal body weight: 75.9 kg (167 lb 7 oz) Serum creatinine: 3 mg/dL (H) 01/09/23 1914  Estimated creatinine clearance: 30 mL/min (A) N/a       Pharmacy has dose-adjusted the following medication(s):    Date Previous Order Adjusted Order   1/9/2023 Cefepime 1000mg q24h Cefepime 2000mg q12h       These changes were made per protocol according to the [facility] Clinical Guidance for Pharmacists. *Please note this dose may need readjusted if patient's condition changes. Please contact pharmacy with any questions regarding these changes.     Citlali Neumann, [address] [facility]  1/9/2023  9:11 PM

## 2023-01-10 NOTE — ANESTHESIA POSTPROCEDURE EVALUATION
Department of Anesthesiology  Postprocedure Note    Patient: Leonora Sandoval  MRN: [id number]  YOB: 1975  Date of evaluation: 1/10/2023      Procedure Summary     Date: 01/10/23 Room / Location: [facility] OR 01 / [facility]    Anesthesia Start: 8791 Anesthesia Stop: 6540    Procedure: BRONCHOSCOPY LEFT VIDEO ASSISTED THORACOSCOPY, DECORTICATION, PLUERODESIS (Left) Diagnosis:       Empyema ([address]      (Empyema ([address] [J86.9])    Surgeons: Dre Collado DO Responsible Provider: Amanda Dominguez DO    Anesthesia Type: general ASA Status: 3          Anesthesia Type: No value filed.     Justine Phase I:      Justine Phase II:        Anesthesia Post Evaluation    Patient location during evaluation: PACU  Patient participation: complete - patient participated  Level of consciousness: awake and alert  Airway patency: patent  Nausea & Vomiting: no nausea and no vomiting  Complications: no  Cardiovascular status: blood pressure returned to baseline  Respiratory status: acceptable  Hydration status: euvolemic  Multimodal analgesia pain management approach

## 2023-01-10 NOTE — BRIEF OP NOTE
Brief Postoperative Note    Wilfredo Martin  YOB: 1975  [id number]    Pre-operative Diagnosis: left sided empyema    Post-operative Diagnosis: Same    Operation: left VATS, decortication    Anesthesia: General    Surgeon: Vira Parson    Assistants: Joann Hall    Estimated Blood Loss: 119 cc    Complications: none apparent    Implants: none

## 2023-01-10 NOTE — PROGRESS NOTES
Dr. Marielle Matthew via [employer] with pt concerns. Pt still in pain 7/10 after given tylenol 650 at 4:58PM. Only PO pain med on is roxicodone. Pt and family state that it made him sick the last time. Requesting PO percocet if possible. Will place orders.

## 2023-01-10 NOTE — CONSULTS
CTS Consult    Patient name: Alva Aceves    Reason for consult: complex empyema, need for possible VATS    Referring Physician: Alessandra Paniagua    Primary Care Physician: No primary care provider on file. Date of service: 1/10/2023    Chief Complaint: chest pain    HPI: 48yo M with PMH bipolar disorder presented to ED 1/7/2023 with 1 month history of intermittent cough, SOB and chest pain. He was found to have left pleural effusion. He underwent thoracentesis which failed to resolve the fluid collection. CTS was consulted for possible VATS. Currently, he denies complaints. He is on room air. He appears comfortable. He does acknowledge mild pain from the thoracentesis. Allergies:    Allergies   Allergen Reactions    Sulfa Antibiotics        Home medications:    Current Facility-Administered Medications   Medication Dose Route Frequency Provider Last Rate Last Admin    ceFAZolin (ANCEF) 2,000 mg in sterile water 20 mL IV syringe  2,000 mg IntraVENous On Call to [address] - CNP        buPROPion (WELLBUTRIN XL) extended release tablet 300 mg  300 mg Oral Daily Yoshi Nigh, DO   300 mg at 01/10/23 0820    oxyCODONE-acetaminophen (PERCOCET) 5-325 MG per tablet 1 tablet  1 tablet Oral Q4H PRN Yoshi Nigh, DO   1 tablet at 01/10/23 0629    0.9 % sodium chloride infusion   IntraVENous Continuous Yoshi Nigh,  mL/hr at 01/10/23 0502 New Bag at 01/10/23 0502    medicated lip balm (BLISTEX/CARMEX) stick   Topical PRN LYN Linares - CNP   Given at 01/08/23 1622    QUEtiapine (SEROQUEL) tablet 100 mg  100 mg Oral QPM Wenceslao Huang MD   100 mg at 01/09/23 2048    sodium chloride flush 0.9 % injection 10 mL  10 mL IntraVENous 2 times per day Wenceslao Huang MD   10 mL at 01/09/23 1935    sodium chloride flush 0.9 % injection 10 mL  10 mL IntraVENous PRN Samer Garrick Burkitt, MD        0.9 % sodium chloride infusion   IntraVENous PRN Wenceslao Huang MD        [facility] [place] by provider] enoxaparin (LOVENOX) injection 40 mg  40 mg SubCUTAneous Daily Tracy Arnold MD   40 mg at 01/09/23 1011    ondansetron (ZOFRAN-ODT) disintegrating tablet 4 mg  4 mg Oral Q8H PRN Tracy Arnold MD        Or    ondansetron [facility]) injection 4 mg  4 mg IntraVENous Q6H PRN Tracy Arnold MD   4 mg at 01/09/23 1631    magnesium hydroxide (MILK OF MAGNESIA) 400 MG/5ML suspension 30 mL  30 mL Oral Daily PRN Belen Olson MD        acetaminophen (TYLENOL) tablet 650 mg  650 mg Oral Q6H PRN Tracy Arnold MD   650 mg at 01/08/23 0026    Or    acetaminophen (TYLENOL) suppository 650 mg  650 mg Rectal Q6H PRN Belen Olson MD           Past Medical History:  Past Medical History:   Diagnosis Date    Bipolar 1 disorder ([facility] [address]        Past Surgical History:  Past Surgical History:   Procedure Laterality Date    SHOULDER SURGERY         Social History:  Social History     Socioeconomic History    Marital status:      Spouse name: Not on file    Number of children: Not on file    Years of education: Not on file    Highest education level: Not on file   Occupational History    Not on file   Tobacco Use    Smoking status: Never    Smokeless tobacco: Not on file   Substance and Sexual Activity    Alcohol use: No    Drug use: No    Sexual activity: Not on file   Other Topics Concern    Not on file   Social History Narrative    Not on file     Social Determinants of Health     Financial Resource Strain: Not on file   Food Insecurity: Not on file   Transportation Needs: Not on file   Physical Activity: Not on file   Stress: Not on file   Social Connections: Not on file   Intimate Partner Violence: Not on file   Housing Stability: Not on file       Family History:  History reviewed. No pertinent family history. Review of Systems   All other systems reviewed and are negative.     Labs:  Recent Labs     01/08/23  0619 01/09/23  0631 01/10/23  0615   WBC 13.1* 11.7* 8.4   HGB 12.4* 12.6 12.6   HCT 36.6* 37.4 37.2    344 372      Recent Labs     01/09/23  2105 01/10/23  0615 01/10/23  0756   BUN 24* 26* 26*   CREATININE 3.1* 3.5* 3.4*       Objective:  Vitals BP (!) 159/99   Pulse 90   Temp 97.9 °F (36.6 °C) (Temporal)   Resp 18   Ht 5' 9\" (1.753 m)   Wt 186 lb 6.4 oz (84.6 kg)   SpO2 95%   BMI 27.53 kg/m²   Physical Exam  Vitals and nursing note reviewed. Constitutional:       General: He is not in acute distress. Appearance: Normal appearance. He is normal weight. He is not ill-appearing or toxic-appearing. HENT:      Head: Normocephalic and atraumatic. Mouth/Throat:      Mouth: Mucous membranes are moist.      Pharynx: Oropharynx is clear. Eyes:      General: No scleral icterus. Extraocular Movements: Extraocular movements intact. Conjunctiva/sclera: Conjunctivae normal.   Cardiovascular:      Rate and Rhythm: Normal rate and regular rhythm. Pulses: Normal pulses. Pulmonary:      Effort: Pulmonary effort is normal. No respiratory distress. Abdominal:      General: Abdomen is flat. There is no distension. Palpations: Abdomen is soft. Musculoskeletal:         General: No swelling. Normal range of motion. Cervical back: Normal range of motion. No rigidity. Skin:     General: Skin is warm. Capillary Refill: Capillary refill takes less than 2 seconds. Coloration: Skin is not jaundiced. Neurological:      General: No focal deficit present. Mental Status: He is alert and oriented to person, place, and time. Mental status is at baseline. Psychiatric:         Mood and Affect: Mood normal.         Behavior: Behavior normal.         Thought Content: Thought content normal.         Judgment: Judgment normal.          CT chest  EXAMINATION:   CT OF THE CHEST WITHOUT CONTRAST 1/9/2023 4:01 pm       TECHNIQUE:   CT of the chest was performed without the administration of intravenous   contrast. Multiplanar reformatted images are provided for review. Automated   exposure control, iterative reconstruction, and/or weight based adjustment of   the mA/kV was utilized to reduce the radiation dose to as low as reasonably   achievable. COMPARISON:   CTA chest 01/07/2023, chest x-ray 8 hours prior 01/09/2023       HISTORY:   ORDERING SYSTEM PROVIDED HISTORY: empyema vs parapneumonic effusion   TECHNOLOGIST PROVIDED HISTORY:   Reason for exam:->empyema vs parapneumonic effusion   What reading provider will be dictating this exam?->CRC       FINDINGS:   Mediastinum: Thyroid is homogeneous in attenuation. No bulky mediastinal   adenopathy. Central airways are patent. Esophagus is normal course and   caliber to the distal segment where there is a small hiatal hernia. .  Cardiac   size within normal limits without pericardial effusion. Lungs/pleura: Lungs are clear without focal opacification or consolidation. No suspicious or dominant pulmonary nodule. Moderate left pleural fluid   collection with a partially loculated appearance limited for empyema   evaluation due to lack of intravenous contrast without gross pleural wall   thickening evident similar to prior comparison from 01/07/2023 with adjacent   atelectasis. Upper Abdomen: Visualized portions of the upper abdomen unremarkable. Soft Tissues/Bones: No acute osseous or soft tissue findings. No aggressive   osseous lesion. Impression   Moderate left pleural fluid collection with a partially loculated appearance   limited for empyema evaluation due to lack of intravenous contrast without   gross pleural wall thickening evident similar to prior comparison from   01/07/2023 with adjacent atelectasis. No new or separate consolidation. Assessment: 48yo M with PMH bipolar disorder presented to ED 1/7/2023 with 1 month history of intermittent cough, SOB and chest pain. He was found to have left pleural effusion. He underwent thoracentesis which failed to resolve the fluid collection. CTS was consulted for possible VATS. Plan:   OR today for L VATS, decortication, possible pleurodesis  Preop and consented, informed consent obtained, consent signed and left in the chart    All risks, benefits, alternatives and potential complications explained thoroughly including, but not limited to, bleeding, infection, lung injury, kidney injury, stroke, heart attack, prolonged ventilation, wound complication, need for re-operation, and death, and the patient agrees to proceed.       Electronically signed by Misty Bailey DO on 1/10/2023 at 9:25 AM

## 2023-01-10 NOTE — ADDENDUM NOTE
Addendum  created 01/10/23 1524 by Katie Langford, DO    Order list changed, Order sets accessed, Results reviewed in IB

## 2023-01-10 NOTE — ANESTHESIA PROCEDURE NOTES
Arterial Line:    An arterial line was placed using surface landmarks, in the OR for the following indication(s): continuous blood pressure monitoring and blood sampling needed. A 20 gauge (size), 1 and 3/8 inch (length), Arrow (type) catheter was placed, Seldinger technique not used, into the right radial artery, secured by tape and Tegaderm. Anesthesia type: Local and General    Events:  patient tolerated procedure well with no complications.   Resident/CRNA: LYN Tovar - CRNA  Performed: Resident/CRNA   Preanesthetic Checklist  Completed: patient identified, IV checked, site marked, risks and benefits discussed, surgical/procedural consents, equipment checked, pre-op evaluation, timeout performed, anesthesia consent given, oxygen available and monitors applied/VS acknowledged

## 2023-01-11 ENCOUNTER — APPOINTMENT (OUTPATIENT)
Dept: ULTRASOUND IMAGING | Age: 48
End: 2023-01-11
Payer: COMMERCIAL

## 2023-01-11 ENCOUNTER — APPOINTMENT (OUTPATIENT)
Dept: GENERAL RADIOLOGY | Age: 48
End: 2023-01-11
Payer: COMMERCIAL

## 2023-01-11 LAB
ALBUMIN SERPL-MCNC: 2.5 G/DL (ref 3.5–5.2)
ALP BLD-CCNC: 101 U/L (ref 40–129)
ALT SERPL-CCNC: 15 U/L (ref 0–40)
ANION GAP SERPL CALCULATED.3IONS-SCNC: 16 MMOL/L (ref 7–16)
AST SERPL-CCNC: 16 U/L (ref 0–39)
BILIRUB SERPL-MCNC: 0.6 MG/DL (ref 0–1.2)
BILIRUBIN DIRECT: 0.3 MG/DL (ref 0–0.3)
BILIRUBIN, INDIRECT: 0.3 MG/DL (ref 0–1)
BODY FLUID CULTURE, STERILE: NORMAL
BUN BLDV-MCNC: 32 MG/DL (ref 6–20)
CALCIUM SERPL-MCNC: 9.1 MG/DL (ref 8.6–10.2)
CHLORIDE BLD-SCNC: 101 MMOL/L (ref 98–107)
CHLORIDE URINE RANDOM: 33 MMOL/L
CO2: 17 MMOL/L (ref 22–29)
CREAT SERPL-MCNC: 3 MG/DL (ref 0.7–1.2)
CREATININE URINE: 109 MG/DL (ref 40–278)
CREATININE URINE: 111 MG/DL (ref 40–278)
FUNGUS STAIN: NORMAL
GFR SERPL CREATININE-BSD FRML MDRD: 25 ML/MIN/1.73
GLUCOSE BLD-MCNC: 146 MG/DL (ref 74–99)
GRAM STAIN ORDERABLE: NORMAL
GRAM STAIN ORDERABLE: NORMAL
GRAM STAIN RESULT: NORMAL
HCT VFR BLD CALC: 36.2 % (ref 37–54)
HEMOGLOBIN: 11.6 G/DL (ref 12.5–16.5)
MCH RBC QN AUTO: 28.4 PG (ref 26–35)
MCHC RBC AUTO-ENTMCNC: 32 % (ref 32–34.5)
MCV RBC AUTO: 88.7 FL (ref 80–99.9)
MRSA CULTURE ONLY: NORMAL
PDW BLD-RTO: 12.7 FL (ref 11.5–15)
PLATELET # BLD: 371 E9/L (ref 130–450)
PMV BLD AUTO: 10 FL (ref 7–12)
POTASSIUM SERPL-SCNC: 4.9 MMOL/L (ref 3.5–5)
PROTEIN PROTEIN: 35 MG/DL (ref 0–12)
PROTEIN/CREAT RATIO: 0.3
PROTEIN/CREAT RATIO: 0.3 (ref 0–0.2)
RBC # BLD: 4.08 E12/L (ref 3.8–5.8)
REPORT: NORMAL
SODIUM BLD-SCNC: 134 MMOL/L (ref 132–146)
SODIUM URINE: 49 MMOL/L
THIS TEST SENT TO: NORMAL
TOTAL PROTEIN: 7.2 G/DL (ref 6.4–8.3)
WBC # BLD: 10.6 E9/L (ref 4.5–11.5)

## 2023-01-11 PROCEDURE — 6370000000 HC RX 637 (ALT 250 FOR IP): Performed by: PHYSICIAN ASSISTANT

## 2023-01-11 PROCEDURE — 6360000002 HC RX W HCPCS: Performed by: INTERNAL MEDICINE

## 2023-01-11 PROCEDURE — 6370000000 HC RX 637 (ALT 250 FOR IP): Performed by: INTERNAL MEDICINE

## 2023-01-11 PROCEDURE — 85027 COMPLETE CBC AUTOMATED: CPT

## 2023-01-11 PROCEDURE — 97161 PT EVAL LOW COMPLEX 20 MIN: CPT

## 2023-01-11 PROCEDURE — 82436 ASSAY OF URINE CHLORIDE: CPT

## 2023-01-11 PROCEDURE — 94640 AIRWAY INHALATION TREATMENT: CPT

## 2023-01-11 PROCEDURE — 2580000003 HC RX 258: Performed by: INTERNAL MEDICINE

## 2023-01-11 PROCEDURE — 82570 ASSAY OF URINE CREATININE: CPT

## 2023-01-11 PROCEDURE — 80048 BASIC METABOLIC PNL TOTAL CA: CPT

## 2023-01-11 PROCEDURE — 71045 X-RAY EXAM CHEST 1 VIEW: CPT

## 2023-01-11 PROCEDURE — 97535 SELF CARE MNGMENT TRAINING: CPT

## 2023-01-11 PROCEDURE — 76770 US EXAM ABDO BACK WALL COMP: CPT

## 2023-01-11 PROCEDURE — 36415 COLL VENOUS BLD VENIPUNCTURE: CPT

## 2023-01-11 PROCEDURE — 84300 ASSAY OF URINE SODIUM: CPT

## 2023-01-11 PROCEDURE — 2580000003 HC RX 258: Performed by: PHYSICIAN ASSISTANT

## 2023-01-11 PROCEDURE — 2140000000 HC CCU INTERMEDIATE R&B

## 2023-01-11 PROCEDURE — 97165 OT EVAL LOW COMPLEX 30 MIN: CPT

## 2023-01-11 PROCEDURE — 80076 HEPATIC FUNCTION PANEL: CPT

## 2023-01-11 PROCEDURE — 97530 THERAPEUTIC ACTIVITIES: CPT

## 2023-01-11 PROCEDURE — 6360000002 HC RX W HCPCS: Performed by: PHYSICIAN ASSISTANT

## 2023-01-11 PROCEDURE — 84156 ASSAY OF PROTEIN URINE: CPT

## 2023-01-11 PROCEDURE — 51798 US URINE CAPACITY MEASURE: CPT

## 2023-01-11 RX ORDER — SODIUM CHLORIDE 0.9 % (FLUSH) 0.9 %
5-40 SYRINGE (ML) INJECTION EVERY 12 HOURS SCHEDULED
Status: DISCONTINUED | OUTPATIENT
Start: 2023-01-11 | End: 2023-01-13 | Stop reason: SDUPTHER

## 2023-01-11 RX ORDER — HEPARIN SODIUM (PORCINE) LOCK FLUSH IV SOLN 100 UNIT/ML 100 UNIT/ML
1 SOLUTION INTRAVENOUS PRN
Status: DISCONTINUED | OUTPATIENT
Start: 2023-01-11 | End: 2023-01-14 | Stop reason: HOSPADM

## 2023-01-11 RX ORDER — SODIUM CHLORIDE 0.9 % (FLUSH) 0.9 %
5-40 SYRINGE (ML) INJECTION PRN
Status: DISCONTINUED | OUTPATIENT
Start: 2023-01-11 | End: 2023-01-13 | Stop reason: SDUPTHER

## 2023-01-11 RX ORDER — HEPARIN SODIUM (PORCINE) LOCK FLUSH IV SOLN 100 UNIT/ML 100 UNIT/ML
1 SOLUTION INTRAVENOUS EVERY 12 HOURS SCHEDULED
Status: DISCONTINUED | OUTPATIENT
Start: 2023-01-11 | End: 2023-01-14 | Stop reason: HOSPADM

## 2023-01-11 RX ORDER — SODIUM CHLORIDE 9 MG/ML
INJECTION, SOLUTION INTRAVENOUS PRN
Status: DISCONTINUED | OUTPATIENT
Start: 2023-01-11 | End: 2023-01-14 | Stop reason: HOSPADM

## 2023-01-11 RX ADMIN — BISACODYL 5 MG: 5 TABLET, COATED ORAL at 18:03

## 2023-01-11 RX ADMIN — MUPIROCIN: 20 OINTMENT TOPICAL at 21:44

## 2023-01-11 RX ADMIN — PIPERACILLIN AND TAZOBACTAM 3375 MG: 3; .375 INJECTION, POWDER, FOR SOLUTION INTRAVENOUS at 23:05

## 2023-01-11 RX ADMIN — IPRATROPIUM BROMIDE AND ALBUTEROL SULFATE 1 AMPULE: 2.5; .5 SOLUTION RESPIRATORY (INHALATION) at 20:00

## 2023-01-11 RX ADMIN — OXYCODONE AND ACETAMINOPHEN 1 TABLET: 5; 325 TABLET ORAL at 06:20

## 2023-01-11 RX ADMIN — MAGNESIUM HYDROXIDE 30 ML: 400 SUSPENSION ORAL at 18:02

## 2023-01-11 RX ADMIN — MUPIROCIN: 20 OINTMENT TOPICAL at 10:34

## 2023-01-11 RX ADMIN — SENNOSIDES AND DOCUSATE SODIUM 1 TABLET: 50; 8.6 TABLET ORAL at 21:45

## 2023-01-11 RX ADMIN — ENOXAPARIN SODIUM 30 MG: 100 INJECTION SUBCUTANEOUS at 09:19

## 2023-01-11 RX ADMIN — IPRATROPIUM BROMIDE AND ALBUTEROL SULFATE 1 AMPULE: 2.5; .5 SOLUTION RESPIRATORY (INHALATION) at 12:56

## 2023-01-11 RX ADMIN — PIPERACILLIN AND TAZOBACTAM 4500 MG: 4; .5 INJECTION, POWDER, LYOPHILIZED, FOR SOLUTION INTRAVENOUS at 00:24

## 2023-01-11 RX ADMIN — SODIUM CHLORIDE, PRESERVATIVE FREE 10 ML: 5 INJECTION INTRAVENOUS at 09:20

## 2023-01-11 RX ADMIN — SENNOSIDES AND DOCUSATE SODIUM 1 TABLET: 50; 8.6 TABLET ORAL at 09:21

## 2023-01-11 RX ADMIN — SODIUM CHLORIDE, PRESERVATIVE FREE 10 ML: 5 INJECTION INTRAVENOUS at 21:44

## 2023-01-11 RX ADMIN — OXYCODONE AND ACETAMINOPHEN 1 TABLET: 5; 325 TABLET ORAL at 18:03

## 2023-01-11 RX ADMIN — ACETAMINOPHEN 650 MG: 325 TABLET ORAL at 16:15

## 2023-01-11 RX ADMIN — QUETIAPINE FUMARATE 100 MG: 100 TABLET ORAL at 18:03

## 2023-01-11 RX ADMIN — CEFAZOLIN 2000 MG: 2 INJECTION, POWDER, FOR SOLUTION INTRAMUSCULAR; INTRAVENOUS at 00:16

## 2023-01-11 RX ADMIN — OXYCODONE AND ACETAMINOPHEN 1 TABLET: 5; 325 TABLET ORAL at 12:34

## 2023-01-11 RX ADMIN — IPRATROPIUM BROMIDE AND ALBUTEROL SULFATE 1 AMPULE: 2.5; .5 SOLUTION RESPIRATORY (INHALATION) at 09:16

## 2023-01-11 RX ADMIN — OXYCODONE AND ACETAMINOPHEN 1 TABLET: 5; 325 TABLET ORAL at 22:10

## 2023-01-11 RX ADMIN — IPRATROPIUM BROMIDE AND ALBUTEROL SULFATE 1 AMPULE: 2.5; .5 SOLUTION RESPIRATORY (INHALATION) at 16:31

## 2023-01-11 RX ADMIN — PIPERACILLIN AND TAZOBACTAM 3375 MG: 3; .375 INJECTION, POWDER, FOR SOLUTION INTRAVENOUS at 16:20

## 2023-01-11 RX ADMIN — ACETAMINOPHEN 650 MG: 325 TABLET ORAL at 09:20

## 2023-01-11 RX ADMIN — PIPERACILLIN AND TAZOBACTAM 4500 MG: 4; .5 INJECTION, POWDER, LYOPHILIZED, FOR SOLUTION INTRAVENOUS at 07:43

## 2023-01-11 RX ADMIN — BUPROPION HYDROCHLORIDE 300 MG: 300 TABLET, FILM COATED, EXTENDED RELEASE ORAL at 09:21

## 2023-01-11 ASSESSMENT — PAIN SCALES - GENERAL
PAINLEVEL_OUTOF10: 5
PAINLEVEL_OUTOF10: 4
PAINLEVEL_OUTOF10: 0
PAINLEVEL_OUTOF10: 3
PAINLEVEL_OUTOF10: 7
PAINLEVEL_OUTOF10: 6
PAINLEVEL_OUTOF10: 3
PAINLEVEL_OUTOF10: 7
PAINLEVEL_OUTOF10: 2
PAINLEVEL_OUTOF10: 0

## 2023-01-11 ASSESSMENT — PAIN DESCRIPTION - LOCATION
LOCATION: CHEST
LOCATION: CHEST
LOCATION: FLANK
LOCATION: CHEST

## 2023-01-11 ASSESSMENT — PAIN DESCRIPTION - DESCRIPTORS
DESCRIPTORS: ACHING
DESCRIPTORS: ACHING;DISCOMFORT;SORE
DESCRIPTORS: ACHING
DESCRIPTORS: DISCOMFORT;TENDER;SORE
DESCRIPTORS: ACHING;DULL;DISCOMFORT
DESCRIPTORS: ACHING;DISCOMFORT;DULL

## 2023-01-11 ASSESSMENT — PAIN DESCRIPTION - ORIENTATION
ORIENTATION: LEFT

## 2023-01-11 ASSESSMENT — PAIN - FUNCTIONAL ASSESSMENT
PAIN_FUNCTIONAL_ASSESSMENT: PREVENTS OR INTERFERES SOME ACTIVE ACTIVITIES AND ADLS
PAIN_FUNCTIONAL_ASSESSMENT: ACTIVITIES ARE NOT PREVENTED

## 2023-01-11 NOTE — PROGRESS NOTES
Occupational Therapy  OCCUPATIONAL THERAPY INITIAL EVALUATION    [address] [address] [address]      [id number]                                                Patient Name: Angela Fraesr  MRN: [id number]  : 1975  Room: [address] [place] [address]    Evaluating OT: Makayla Rodriguez OTR/L #[id number]     Referring Provider: Garland Olivera DO  Specific Provider Orders/Date: OT eval and treat 23    Diagnosis: Acute chest pain [R07.9]  Pleural effusion on left [J90]  Pleural effusion [J90]   Pt admitted to hospital with chest pain, SOB, and back pain     Surgery / Procedure: thoracentesis 23   L VATS 1/10/23     Pertinent Medical History:  has a past medical history of Bipolar 1 disorder ([address]        Precautions:  Fall Risk, L chest tube to suction    Assessment of current deficits    [x] Functional mobility  [x]ADLs  [x] Strength               []Cognition    [x] Functional transfers   [x] IADLs         [x] Safety Awareness   [x]Endurance    [] Fine Coordination              [x] Balance      [] Vision/perception   []Sensation     []Gross Motor Coordination  [] ROM  [] Delirium                   [] Motor Control     OT PLAN OF CARE   OT POC based on physician orders, patient diagnosis and results of clinical assessment    Frequency/Duration 1-3 days/wk for 2 weeks PRN   Specific OT Treatment Interventions to include:   * Instruction/training on adapted ADL techniques and AE recommendations to increase functional independence within precautions       * Training on energy conservation strategies, correct breathing pattern and techniques to improve independence/tolerance for self-care routine  * Functional transfer/mobility training/DME recommendations for increased independence, safety, and fall prevention  * Patient/Family education to increase follow through with safety techniques and functional independence  * Recommendation of environmental modifications for increased safety with functional transfers/mobility and ADLs  * Therapeutic exercise to improve motor endurance, ROM, and functional strength for ADLs/functional transfers  * Therapeutic activities to facilitate/challenge dynamic balance, stand tolerance for increased safety and independence with ADLs    Recommended Adaptive Equipment:  TBD     Home Living: Pt lives with fiance in a 1 story home with 1 LEATHA    Bathroom setup: tub/shower combo    Equipment owned: none    Prior Level of Function: Independent with ADLs , Independent  with IADLs; ambulated without AD   Driving: yes    Pain Level: Pt reports L chest tube site pain rated at 4/10;  Therapist facilitated repositioning to address pain      Cognition: A&O: 4/4; Follows 2 step directions   Memory:  good   Sequencing:  good   Problem solving:  fair+   Judgement/safety:  fair     Functional Assessment:  AM-PAC Daily Activity Raw Score: 19/24   Initial Eval Status  Date: 1/11/23 Treatment Status  Date: STGs = LTGs  Time frame: 10-14 days   Feeding Independent      Grooming Supervision   Independent    UB Dressing Supervision       Cuing on technique due to chest tube site pain      Independent    LB Dressing Supervision     Figure 4 technique   Independent    Bathing Supervision  Independent    Toileting Supervision   Independent    Bed Mobility  Mod I     Functional Transfers Supervision   Independent    Functional Mobility Supervision     Ambulated in room and hallway without AD  Independent    Balance Sitting:     Static:  indep    Dynamic:indep  Standing: sup     Activity Tolerance F-     Visual/  Perceptual wfl                    Hand Dominance right   Strength ROM Additional Info:    RUE   wfl wfl good  and wfl FMC/dexterity noted during ADL tasks     LUE wfl wfl good  and wfl FMC/dexterity noted during ADL tasks     Hearing: wfl  Sensation:wfl  Tone: wfl  Edema:none noted     Comments: Upon arrival patient supine in bed and agreeable to OT Session. Therapist educated pt on role of OT. At end of session, patient semi-supine in bed with call light and phone within reach, all lines and tubes intact. Overall patient demonstrated decreased independence and safety during completion of ADL/functional transfer/mobility tasks. Pt would benefit from continued skilled OT to increase safety and independence with completion of ADL/IADL tasks for functional independence and quality of life. Treatment: OT treatment provided this date includes: Facilitation of bed mobility, functional transfers (various surfaces), standing tolerance tasks (addressing posture, balance and activity tolerance; impacting ADLs and functional activity) and functional ambulation tasks without AD  - skilled cuing on hand placement, posture, body mechanics, energy conservation techniques and safety. Therapist facilitated self-care retraining: UB/LB self-care tasks (robe, socks - figure 4 technique), simulated toileting task and standing grooming tasks while educating pt on sequencing, modified techniques, posture, safety and energy conservation techniques. Skilled monitoring of HR, O2 sats and pts response to treatment. Rehab Potential: Good  for established goals     Patient / Family Goal: return home      Patient and/or family were instructed on functional diagnosis, prognosis/goals and OT plan of care. Demonstrated fair+ understanding.      Eval Complexity: Low    Time In: 1515  Time Out: 1540  Total Treatment Time: 10 minutes    Min Units   OT Eval Low 97165  x  1   OT Eval Medium 84988      OT Eval High 74075      OT Re-Eval [id number]       Therapeutic Ex 49493       Therapeutic Activities 40493 2     ADL/Self Care 75886  8  1   Orthotic Management 56252       Manual 23018     Neuro Re-Ed 95814       Non-Billable Time          Evaluation Time additionally includes thorough review of current medical information, gathering information on past medical history/social history and prior level of function, interpretation of standardized testing/informal observation of tasks, assessment of data and development of plan of care and goals.           Abigail Wheeler OTR/L #[id number]

## 2023-01-11 NOTE — CARE COORDINATION
Received pt in transfer from [facility]. LOS: day 4. POD#1 left VATS, decort. Right angle CT to suction. ID consulted. Pt in with empyema. Pt was not setup with hhc for potential iv antibiotics. Met with pt and his fiance, Knight Maid, in room. Up in [place]alo. On RA. Plan is to return home with Knightrussel Bradford at discharge. I spoke with them about setting up for hhc for possible iv antibiotics and they both were agreeable. Pt has no preference for hhc agency. I will be limited on hhc choices due to [employer] coverage. [facility] [address] [facility] unable to accept pt due to insurance coverage. Referral was made to Manuel Cho at [facility] and they accepted pt. Earliest start of hhc services is Tuesday 01/17. Pt and [employer] is setup. Voiced no other needs for home at present. Sw/cm will follow.

## 2023-01-11 NOTE — PROGRESS NOTES
POD#1 Awake, alert. No complaints. Denies CP, palpitations, SOB at rest, dizziness/lightheadedness. Vitals:    01/10/23 1932 01/10/23 2323 01/11/23 0342 01/11/23 0620   BP: 129/78 122/81 121/75    Pulse: (!) 107 99 77    Resp: 18 18 15 20   Temp: 97.3 °F (36.3 °C) 97.1 °F (36.2 °C) 97 °F (36.1 °C)    TempSrc: Temporal Temporal Temporal    SpO2: 97% 97% 96%    Weight:       Height:         O2: 2L/NC      Intake/Output Summary (Last 24 hours) at 1/11/2023 0725  Last data filed at 1/11/2023 0622  Gross per 24 hour   Intake 720 ml   Output 680 ml   Net 40 ml         UO: 500mL/8hr   CT output: Pleural: 30mL/8hr (180mL/24hrs)      Recent Labs     01/10/23  1420 01/10/23  1644 01/11/23  0616   WBC 14.2* 12.1* 10.6   HGB 11.6* 11.7* 11.6*   HCT 35.4* 35.9* 36.2*    374 371      Recent Labs     01/10/23  1420 01/10/23  1644 01/11/23  0616   BUN 26* 29* 32*   CREATININE 3.3* 3.2* 3.0*         Telemetry: SR      Physical Exam  Constitutional:       General: He is not in acute distress. Cardiovascular:      Rate and Rhythm: Normal rate and regular rhythm. Heart sounds: Normal heart sounds. Pulmonary:      Effort: No respiratory distress. Breath sounds: Normal breath sounds. Comments: CT x2 in place and secure. Abdominal:      General: There is no distension. Palpations: Abdomen is soft. Skin:     General: Skin is warm and dry. Neurological:      Mental Status: He is alert and oriented to person, place, and time. Psychiatric:         Mood and Affect: Mood normal.         Behavior: Behavior normal.           POD#1    A/P:     1) Empyema  --Stable s/p Left VATS, decortication on 1/10/2022  --Maintaining NSR  --VSS  --Consult ID for antibiotic recommendations  --On 2L -- wean as able to keep SPO2 >92%  --chest tubes without significant output and without airleaks. Straight chest tube removed without difficulty. Patient tolerated well.  Continue right angle tube to suction, likely to remove tomorrow.    --Increase activity as tolerated  --Encourage incentive spirometry   --lovenox for dvt prophylaxis      2) Expected acute post operative anemia secondary to surgery  --stable - hgb 11.6        3) Constipation--expected delayed return of bowel function  --secondary to anesthesia, narcotics, decreased oral intake, and decreased physical activity   --continue ordered bowel regimen      4) NEGRO   --Renal following   --Scr 3.0 today   --avoid nephrotoxins and hypotension      Continue supportive care      This patient's case and care plan was discussed with the attending surgeon

## 2023-01-11 NOTE — PROGRESS NOTES
Hospitalist Progress Note      Synopsis: Patient admitted on 1/7/2023 Mr. Hanane Fuchs, a 50y.o. year old male  who  has a past medical history of Bipolar 1 disorder ([address] presents with one month of intermittent cough and SOB, associated with left chest pain more upon coughing associated with low grade fever. Has sputum production but clear. Peggyann Nightingale left chest pain, no relieving factors, denies nausea or vomiting no abdominal pain or diarrhea no lightheadedness or tingling. . Underwent thoracentesis which he tolerated well. Concern for complicated empyema. Thoracic surgery consulted. ECHO ordered and shows EF of 55%, indeterminate diastolic function. No obvious valvular abnormalities. Patient with repeat CT that shows moderate left pleural fluid collection with partially loculated appearance. Thoracic surgery consulted and patient underwent VATS on 1/10/2023. Patient also with NEGRO. Vancomycin discontinued. Continue Zosyn renally adjusted. Start IVF. Patient received contrast administration, Lasix, and vancomycin. Nephrology consulted. Nephrology stopped IVF. Creatinine downtrending. Subjective    Feels better today. Constipated. Passing gas. No nausea or vomiting. No fevers or chills. Good urine output. Feels like not completely emptying bladder     Exam:  /88   Pulse 94   Temp 97.8 °F (36.6 °C) (Temporal)   Resp 18   Ht 5' 9\" (1.753 m)   Wt 186 lb 6.4 oz (84.6 kg)   SpO2 95%   BMI 27.53 kg/m²   -General:  Awake, alert, oriented X 3. Well developed, well nourished, well groomed. No apparent distress. -HEENT:  Normocephalic, atraumatic. Pupils equal, round, reactive to light. No scleral icterus. No conjunctival injection. Normal lips, teeth, and gums. No nasal discharge. Neck:  Supple    -Heart:  RRR, no murmurs, gallops, rubs    -Lungs:  CTA bilaterally, bilat symmetrical expansion, no wheeze, rales, or rhonchi    -Abdomen:   Bowel sounds present, soft, nontender, no masses, no organomegaly, no peritoneal signs    -Extremities: normal ROM. No Cyanosis clubbing or edema    -Skin: Warm and dry    -Neuro:  AAO x 3 . Cranial nerves 2-12 intact, no focal deficits     -Psych : normal .    Medications:  Reviewed    Infusion Medications    sodium chloride       Scheduled Medications    piperacillin-tazobactam  3,375 mg IntraVENous Q8H    sodium chloride flush  5-40 mL IntraVENous 2 times per day    enoxaparin  30 mg SubCUTAneous Daily    acetaminophen  650 mg Oral Q6H    sennosides-docusate sodium  1 tablet Oral BID    ipratropium-albuterol  1 ampule Inhalation Q4H WA    mupirocin   Topical BID    buPROPion  300 mg Oral Daily    QUEtiapine  100 mg Oral QPM     PRN Meds: sodium chloride flush, sodium chloride, ondansetron, bisacodyl, morphine, sodium chloride, trimethobenzamide, oxyCODONE-acetaminophen, medicated lip balm    I/O    Intake/Output Summary (Last 24 hours) at 1/11/2023 1559  Last data filed at 1/11/2023 1423  Gross per 24 hour   Intake 530 ml   Output 1100 ml   Net -570 ml       Labs:   Recent Labs     01/10/23  1420 01/10/23  1644 01/11/23  0616   WBC 14.2* 12.1* 10.6   HGB 11.6* 11.7* 11.6*   HCT 35.4* 35.9* 36.2*    374 371       Recent Labs     01/10/23  1420 01/10/23  1644 01/11/23  0616    138 134   K 4.8 5.0 4.9    106 101   CO2 16* 16* 17*   BUN 26* 29* 32*   CREATININE 3.3* 3.2* 3.0*   CALCIUM 8.3* 8.5* 9.1       Recent Labs     01/09/23  0631 01/10/23  0615 01/11/23  0616   PROT 7.1 7.1 7.2   ALKPHOS 120 118 101   ALT 19 17 15   AST 15 14 16   BILITOT 0.9 0.7 0.6       Recent Labs     01/09/23  1914   INR 1.3       No results for input(s): [place] in the last 72 hours. Chronic labs:  Lab Results   Component Value Date    INR 1.3 01/09/2023       Radiology:  Imaging studies reviewed today       Assessment/PLAN:    Empyema  NEGRO  Depression    Status post thoracentesis 1/8 ,600 cc out. Echo reviewed.  EF 55%, no sig valve disease, and indeterminate diastolic function   MRSA swab pending  On Zosyn monotherapy  VATS on 1/10/2023  Nephrology consultation given worsening renal failure-patient with good urine output -likely multi-factoriaI did stop Lasix and Vancomycin was stopped. IVF stopped today as per nephrology. Continue home dose of seroquel and wellbutrin. ID consulted for antibiotic management. Check post void residual  Will schedule stool softeners and add prn MOM     Diet: ADULT DIET; Regular; Low Potassium (Less than 3000 mg/day)  Code Status: Full Code      +++++++++++++++++++++++++++++++++++++++++++++++++  Dusty Clayton DO   1/11/2023  [facility].  +++++++++++++++++++++++++++++++++++++++++++++++++  NOTE: This report was transcribed using voice recognition software. Every effort was made to ensure accuracy; however, inadvertent computerized transcription errors may be present.

## 2023-01-11 NOTE — HOME CARE
RECEIVED REFERRAL FOR POSSIBLE IVAT AT [place].  WILL NEED SIGNED SCRIPTS AND SIGNED PHARMACY CONSENT FORM FAXED BACK TO [place] [address] INTAKE DEPARTMENT WHEN AVAILABLE. [phone]    Jorge Whittaker LPN   [place] [address]

## 2023-01-11 NOTE — PROGRESS NOTES
New order for post void bladder scan, 245 ml completed after voiding at 4:15 pm. Reported results to physician, explained to patient physician wanted a straight cath, patient voiced resistance to straight cath, he feels that once he has a bowel movement that his bladder will empty completely. Physician notified of patient thoughts with new orders for bowel medication, continue monitoring post void residual. Patient give 5 mg of bisacodyl and milk of magnesia. Patient informed to inform nursing of bowel movement for post void residual with verbal understanding voiced.

## 2023-01-11 NOTE — CONSULTS
[facility] CONSULT NOTE    Reason for Consult: Empyema  Requested by: LYN Dobbins CNP    Chief complaint: Chest pain    History Obtained From: Patient and EMR    HISTORY Stacey              The patient is a 50 y.o. male with history of bipolar disorder, presented on 01/07 with pleuritic chest and back pain, seen by productive cough and congestion with low-grade fever for the past month. On admission, he was septic with fever of 101.4 °F, leukocytosis of 13,000, CT chest showing moderate to large sized left-sided pleural effusion causing significant compression atelectasis on the left lower lobe for which he underwent thoracentesis yielding 600 mL of pita/pink exudative pleural fluid with pH of 7.2. He underwent left VATS with decortication on 01/10 during which significant amount of fibropurulent exudate was noted. Pleural fluid gram stain and culture showed rare polymorphonuclear leukocytes, no epithelial cells, no organisms, no growth. Tissue gram stain and culture showed few polymorphonuclear leukocytes, no epithelial cells, no organisms, no growth to date. MRSA nares culture was negative. Piperacillin-tazobactam was started on admission. He received vancomycin from 01/08-0 1/09 and cefazolin perioperatively. ID service was subsequently consulted for further recommendations.     Past Medical History  Past Medical History:   Diagnosis Date    Bipolar 1 disorder ([facility]        Current Facility-Administered Medications   Medication Dose Route Frequency Provider Last Rate Last Admin    sodium chloride flush 0.9 % injection 5-40 mL  5-40 mL IntraVENous 2 times per day SG Villatoro   10 mL at 01/11/23 6052    sodium chloride flush 0.9 % injection 5-40 mL  5-40 mL IntraVENous PRN SG Villatoro        0.9 % sodium chloride infusion   IntraVENous PRN SG Villatoro        enoxaparin Sodium (LOVENOX) injection 30 mg  30 mg SubCUTAneous Daily SG Villatoro   30 mg at 01/11/23 3614 acetaminophen (TYLENOL) tablet 650 mg  650 mg Oral Q6H SG Villatoro   650 mg at 01/11/23 0920    ondansetron (ZOFRAN) injection 4 mg  4 mg IntraVENous Q6H PRN SG Villatoro        sennosides-docusate sodium (SENOKOT-S) 8.6-50 MG tablet 1 tablet  1 tablet Oral BID SG Villatoro   1 tablet at 01/11/23 1799    bisacodyl (DULCOLAX) EC tablet 5 mg  5 mg Oral Daily PRN SG Villatoro        ipratropium-albuterol (DUONEB) nebulizer solution 1 ampule  1 ampule Inhalation Q4H WA SG Villatoro   1 ampule at 01/11/23 0916    morphine (PF) injection 2 mg  2 mg IntraVENous Q4H PRN SG Villatoro        mupirocin (BACTROBAN) 2 % ointment   Topical BID SG Villatoro   Given at 01/11/23 1034    sodium chloride (OCEAN, BABY AYR) 0.65 % nasal spray 1 spray  1 spray Each Nostril PRN SG Villatoro        trimethobenzamide (TIGAN) injection 200 mg  200 mg IntraMUSCular Q6H PRN SG Villatoro        piperacillin-tazobactam (ZOSYN) 4,500 mg in dextrose 5 % 100 mL IVPB (Xscq2Ewh)  4,500 mg IntraVENous Q8H Delores Dacosta, DO 25 mL/hr at 01/11/23 0743 4,500 mg at 01/11/23 0743    ceFAZolin (ANCEF) 2,000 mg in sterile water 20 mL IV syringe  2,000 mg IntraVENous Q12H SG Villatoro   2,000 mg at 01/11/23 0016    oxyCODONE-acetaminophen (PERCOCET) 5-325 MG per tablet 1 tablet  1 tablet Oral Q4H PRN Jad Ku, DO   1 tablet at 01/11/23 0508    buPROPion (WELLBUTRIN XL) extended release tablet 300 mg  300 mg Oral Daily SG Villatoro   300 mg at 01/11/23 3949    medicated lip balm (BLISTEX/CARMEX) stick   Topical PRN SG Villatoro   Given at 01/08/23 1622    QUEtiapine (SEROQUEL) tablet 100 mg  100 mg Oral QPM SG Villatoro   100 mg at 01/10/23 1817       Allergies   Allergen Reactions    Sulfa Antibiotics        Surgical History  Past Surgical History:   Procedure Laterality Date    SHOULDER SURGERY      THORACOSCOPY Left 1/10/2023    BRONCHOSCOPY LEFT VIDEO ASSISTED THORACOSCOPY, DECORTICATION, PLUERODESIS performed by Nola Lemus DO at [facility] OR        Social History  Social History     Socioeconomic History    Marital status:    Tobacco Use    Smoking status: Never    Smokeless tobacco: Not on file   Substance and Sexual Activity    Alcohol use: No    Drug use: No       Family Medical History  History reviewed. No pertinent family history. Review of Systems:  Constitutional: Had fever, had chills  Eyes: No vision changes, no retroorbital pain  ENT: No hearing changes, no ear pain  Respiratory: Had cough, no dyspnea  Cardiovascular: No chest pain, no palpitations  Gastrointestinal: No abdominal pain, had diarrhea  Genitourinary: No dysuria, no hematuria  Integumentary: No rash, no itching  Musculoskeletal: No muscle pain, no joint pain  Neurologic: No headache, no numbness in extremities    Physical Examination:  Vitals:    01/10/23 2323 01/11/23 0342 01/11/23 0620 01/11/23 0807   BP: 122/81 121/75  (!) 143/87   Pulse: 99 77  83   Resp: 18 15 20 18   Temp: 97.1 °F (36.2 °C) 97 °F (36.1 °C)  96.9 °F (36.1 °C)   TempSrc: Temporal Temporal  Temporal   SpO2: 97% 96%  96%   Weight:       Height:         Constitutional: Alert, not in distress  Eyes: Sclerae anicteric, no conjunctival erythema  ENT: No buccal lesion, no pharyngeal exudates  Neck: No nuchal rigidity, no cervical adenopathy  Lungs: Clear breath sounds, no crackles, no wheezes. Left chest tube in place  Heart: Regular rate and rhythm, no murmurs  Abdomen: Bowel sounds present, soft, nontender  Skin: Warm and dry, no active dermatoses  Musculoskeletal: No joint erythema, no joint swelling    Labs, imaging, and medical records/notes were personally reviewed. Assessment:  Empyema, left, s/p left VATS with decortication on 01/10    Plan:  Continue piperacillin-tazobactam 3.375 g every 8 hours. Anticipate 3-4 weeks of IV antibiotic therapy from 01/10-02/07. Insert midline.   Maintain midline care and monitor labs and schedule follow-up appointment per IV antibiotic protocol for OPAT as indicated on discharge instructions. Follow up with me at [facility] on 02/02. Follow-up tissue cultures. Continue supportive care. Thank you for involving me in the care of Sim Ibarra. I will continue to follow. Please do not hesitate to call for any questions or concerns.     Electronically signed by Henrietta Ag MD on 1/11/2023 at 11:18 AM

## 2023-01-11 NOTE — PLAN OF CARE
Problem: Pain  Goal: Verbalizes/displays adequate comfort level or baseline comfort level  1/11/2023 0254 by Melinda Harden RN  Outcome: Progressing     Problem: Discharge Planning  Goal: Discharge to home or other facility with appropriate resources  1/11/2023 0254 by Melinda Harden RN  Outcome: Progressing

## 2023-01-11 NOTE — PATIENT CARE CONFERENCE
P Quality Flow/Interdisciplinary Rounds Progress Note        Quality Flow Rounds held on January 11, 2023    Disciplines Attending:  Bedside Nurse, , , and Nursing Unit Leadership    Sarah Anguiano was admitted on 1/7/2023 10:08 AM    Anticipated Discharge Date:       Disposition:    Alonzo Score:  Alonzo Scale Score: 20    Readmission Risk              Risk of Unplanned Readmission:  17           Discussed patient goal for the day, patient clinical progression, and barriers to discharge.   The following Goal(s) of the Day/Commitment(s) have been identified:  report labs/diagnotics       Madelaine Terry RN  January 11, 2023

## 2023-01-11 NOTE — PROGRESS NOTES
Physical Therapy    Initial Assessment     Name: Alan Wiggins  : 1975  MRN: [id number]      Date of Service: 2023    Evaluating PT: Siri Shoemaker PT, FRANCESCA [id number]      Room #:  4168/7534-D  Diagnosis:  Acute chest pain [R07.9]  Pleural effusion on left [J90]  Pleural effusion [J90]  PMHx/PSHx:   has a past medical history of Bipolar 1 disorder ([facility] [address] Procedure/Surgery:  Thoracentesis (), Left VATS (1/10)  Precautions:  Fall Risk, Chest tube to Continuous Suction    SUBJECTIVE:    Pt lives with fiance in a single story house with 1 stair(s) and 1 rail(s) to enter. Pt ambulated without AD prior to admission. OBJECTIVE:   Initial Evaluation  Date: 23 Treatment Date: Short Term/ Long Term   Goals   AM-PAC 6 Clicks      Was pt agreeable to Eval/treatment? Yes     Does pt have pain? 4/10 chest tube site     Bed Mobility  Rolling: NT  Supine to sit: Independent   Sit to supine: Independent   Scooting: Independent   NA   Transfers Sit to stand: Supervision  Stand to sit: Supervision  Stand pivot: Supervision without AD  Sit to stand: Independent   Stand to sit: Independent   Stand pivot: Independent    Ambulation   200 feet without AD with Supervision  >400 feet Independent    Stair negotiation: ascended and descended NT  >4 step(s) with 1 rail(s) Mod Independent    ROM BUE: Refer to OT note  BLE: WFL     Strength BUE: Refer to OT note  BLE: WFL     Balance Sitting EOB: Independent   Dynamic Standing: Supervision without   AD  Dynamic Standing: Independent      Pt is A & O x: 4 to person, place, month/year, and situation. Sensation: Pt denies numbness and tingling of extremities. Edema: Unremarkable. Patient education  Pt educated on PT role in acute care setting.     Patient response to education:   Pt verbalized understanding Pt demonstrated skill Pt requires further education in this area   Yes NA No     ASSESSMENT:    Conditions Requiring Skilled Therapeutic Intervention:    [x]Decreased strength     []Decreased ROM  [x]Decreased functional mobility  [x]Decreased balance   [x]Decreased endurance   []Decreased posture  []Decreased sensation  []Decreased coordination   []Decreased vision  [x]Decreased safety awareness   [x]Increased pain       Comments:    Pt was in bed upon room entry; agreeable to PT evaluation. Family present. RN cleared for pt to be removed from chest tube suction for ambulation. Pt ambulated around unit without AD. Gait was steady and safe. Pt denied SOB. O2 sat was 93-97% on RA with all activity. Pt was placed back on suction. All questions and concerns were addressed. Pt was left in bed with all needs met at conclusion of session. RN notified. Treatment:  Patient practiced and was instructed in the following treatment:    Therapeutic activities:  Transfers: Pt completed multiple transfers from various surfaces (EOB x2, commode x1). Ambulation: Pt ambulated around unit without AD. Pt was cued for safety. Vitals and symptoms were closely monitored throughout session. Pt's/family goals:  1. To return home. Prognosis is Good for reaching above PT goals. Patient and or family understand(s) diagnosis, prognosis, and plan of care. Yes. PHYSICAL THERAPY PLAN OF CARE:    PT POC is established based on physician order and patient diagnosis     Referring provider/PT Order:    Start   Ordering Provider    01/10/23 1600  PT eval and treat  Start:  01/10/23 1600,   End:  01/10/23 1600,   ONE TIME,   Standing Count:  1 Occurrences,   R         SG Matthews      Diagnosis:  Acute chest pain [R07.9]  Pleural effusion on left [J90]  Pleural effusion [J90]  Specific instructions for next treatment:  Progress activity.     Current Treatment Recommendations:     [x] Strengthening to improve independence with functional mobility   [] ROM to improve independence with functional mobility   [x] Balance Training to improve static/dynamic balance and to reduce fall risk  [x] Endurance Training to improve activity tolerance during functional mobility   [] Transfer Training to improve safety and independence with all functional transfers   [x] Gait Training to improve gait mechanics, endurance and assess need for appropriate assistive device  [x] Stair Training in preparation for safe discharge home and/or into the community   [] Positioning to prevent skin breakdown and contractures  [x] Safety and Education Training   [] Patient/Caregiver Education   [] HEP  [] Other     PT long term treatment goals are located in above grid    Frequency of treatments: 2-5x/week x 1-2 weeks. Time in  1515  Time out  1535    Total Treatment Time  10 minutes     Evaluation Time includes thorough review of current medical information, gathering information on past medical history/social history and prior level of function, completion of standardized testing/informal observation of tasks, assessment of data and education on plan of care and goals.     CPT codes:  [x] Low Complexity PT evaluation 04189  [] Moderate Complexity PT evaluation 90562  [] High Complexity PT evaluation 99908  [] PT Re-evaluation 32578  [] Gait training 13494 0 minutes  [] Manual therapy 02641 0 minutes  [x] Therapeutic activities 30128 10 minutes  [] Therapeutic exercises 91973 0 minutes  [] Neuromuscular reeducation 18841 0 minutes     Jeana Figueroa, PT, DPT  [id number]

## 2023-01-11 NOTE — CONSULTS
[facility] MD Christine Templeton MD Octaviano Highland, MD Layman Anis, CNP Crystal Lambling, ANP Jerilyn Blank, CNP  Consultation  Patient's Name: Deni Sosa  1:40 PM  1/11/2023    Nephrologist: Chaparro Prieto MD    Reason for Consult:  Acute kidney injury   Requesting Physician:  Lorenzo Church DO    Chief Complaint:  Chest and back pain    History Obtained From: Patient, chart    History of Present Ilness:         Mr. Charmayne Char is a pleasant 50year old gentleman who presented to the emergency room on 1/7 with the complaint of severe back pain that radiated to his chest. He reports that he has also felt congested for one month. He states that the pain would get worse when he would take in a deep breath and describes the pain as stabbing. He reports that he was not eating well at home prior to hospitalization and was also having diarrhea. CTA was done in the emergency room with intravenous contrast and revealed a moderate to large left pleural effusion. Thoracentesis was done with a removal of 600 cc of fluid. Thoracic surgery was then consulted for a possible VATS with decortication which took place on 1/10. He now has a left sided chest tube connected to suction. We were consulted for acute kidney injury. Upon arrival to the emergency room his creatinine was 0.8 mg/dL. Two days after receiving intravenous contrast his creatinine level went up to 3.5 mg/dL. He denies a history of kidney disease. His past medical history is significant for Bipolar 1 disorder. He currently has no complaints and reports that his appetite has improved greatly. He denies diarrhea, chest pain, dyspnea, nausea, or vomiting. He does have an ongoing moist productive cough. He is on 2 L nasal canula.      Past Medical History:   Diagnosis Date    Bipolar 1 disorder [facility])        Past Surgical History:   Procedure Laterality Date    SHOULDER SURGERY      THORACOSCOPY Left 1/10/2023    BRONCHOSCOPY LEFT VIDEO ASSISTED THORACOSCOPY, DECORTICATION, PLUERODESIS performed by Dre Collado DO at [address] [place] [address]       History reviewed. No pertinent family history. reports that he has never smoked. He does not have any smokeless tobacco history on file. He reports that he does not drink alcohol and does not use drugs.     Allergies:  Sulfa antibiotics    Current Medications:    piperacillin-tazobactam (ZOSYN) 3,375 mg in sodium chloride 0.9 % 50 mL IVPB ([id number]), Q8H  sodium chloride flush 0.9 % injection 5-40 mL, 2 times per day  sodium chloride flush 0.9 % injection 5-40 mL, PRN  0.9 % sodium chloride infusion, PRN  enoxaparin Sodium (LOVENOX) injection 30 mg, Daily  acetaminophen (TYLENOL) tablet 650 mg, Q6H  ondansetron (ZOFRAN) injection 4 mg, Q6H PRN  sennosides-docusate sodium (SENOKOT-S) 8.6-50 MG tablet 1 tablet, BID  bisacodyl (DULCOLAX) EC tablet 5 mg, Daily PRN  ipratropium-albuterol (DUONEB) nebulizer solution 1 ampule, Q4H WA  morphine (PF) injection 2 mg, Q4H PRN  mupirocin (BACTROBAN) 2 % ointment, BID  sodium chloride (OCEAN, BABY AYR) 0.65 % nasal spray 1 spray, PRN  trimethobenzamide (TIGAN) injection 200 mg, Q6H PRN  oxyCODONE-acetaminophen (PERCOCET) 5-325 MG per tablet 1 tablet, Q4H PRN  buPROPion (WELLBUTRIN XL) extended release tablet 300 mg, Daily  medicated lip balm (BLISTEX/CARMEX) stick, PRN  QUEtiapine (SEROQUEL) tablet 100 mg, QPM        Review of Systems:   Constitutional: negative for chills, fatigue, fevers, and malaise  Eyes: negative for icterus, irritation, redness, and visual disturbance  Ears, nose, mouth, throat, and face: negative for ear drainage, earaches, hearing loss, nasal congestion, sore mouth, sore throat, and tinnitus  Respiratory: + cough, negative for dyspnea on exertion, hemoptysis, shortness of breath, and wheezing  Cardiovascular: negative for chest pain, chest pressure/discomfort, dyspnea, and palpitations  Gastrointestinal: negative for abdominal pain, diarrhea, melena, nausea, and vomiting  Genitourinary: negative for dysuria, frequency, and hematuria  Integument/breast: negative for pruritus, rash, and skin lesion(s)  Hematologic/lymphatic: negative for bleeding, easy bruising, and petechiae  Musculoskeletal: negative for arthralgias, muscle weakness, myalgias, and stiff joints  Neurological: negative for dizziness, headaches, vertigo, and weakness  Behavioral/Psych: negative for anxiety, depression, and irritability    Physical exam:  General Appearance:  awake, alert, oriented, in no acute distress  Skin:  Skin color, texture, turgor normal. No rashes or lesions. Eyes:  PERRL, EOMI, Sclera nonicteric, and Conjunctiva clear  Ears:  canals and TMs intact  Nose/Sinuses:  Nares normal. Septum midline. Mucosa normal. No drainage or sinus tenderness. Mouth/Throat:  Mucosa moist.  No lesions. Neck:  neck- supple, no mass, non-tender  Lungs:  Normal expansion. Clear to auscultation on the right, clear to auscultation in the left upper lobe, diminished in lower lobe. No rales, rhonchi, or wheezing. Left sided chest tube in place   Heart:   Regular rate and rhythm without murmur, gallop or rub. Abdomen:  Soft, non-tender, normal bowel sounds. No bruits, organomegaly or masses. Extremities: Extremities warm to touch, pink, with no edema. Musculoskeletal:  No joint swelling, deformity, or tenderness. Peripheral Pulses:  Normal  Neurologic:  Sensation grossly intact.         Data:   Labs:  CBC with Differential:    Lab Results   Component Value Date/Time    WBC 10.6 01/11/2023 06:16 AM    RBC 4.08 01/11/2023 06:16 AM    HGB 11.6 01/11/2023 06:16 AM    HCT 36.2 01/11/2023 06:16 AM     01/11/2023 06:16 AM    MCV 88.7 01/11/2023 06:16 AM    MCH 28.4 01/11/2023 06:16 AM    MCHC 32.0 01/11/2023 06:16 AM    RDW 12.7 01/11/2023 06:16 AM    LYMPHOPCT 1.7 01/10/2023 02:20 PM    MONOPCT 4.2 01/10/2023 02:20 PM    BASOPCT 0.3 01/10/2023 02:20 PM    MONOSABS 0.57 01/10/2023 02:20 PM    LYMPHSABS 0.28 01/10/2023 02:20 PM    EOSABS 0.00 01/10/2023 02:20 PM    BASOSABS 0.00 01/10/2023 02:20 PM     CMP:    Lab Results   Component Value Date/Time     01/11/2023 06:16 AM    K 4.9 01/11/2023 06:16 AM    K 4.2 01/09/2023 09:05 PM     01/11/2023 06:16 AM    CO2 17 01/11/2023 06:16 AM    BUN 32 01/11/2023 06:16 AM    CREATININE 3.0 01/11/2023 06:16 AM    LABGLOM 25 01/11/2023 06:16 AM    GLUCOSE 146 01/11/2023 06:16 AM    PROT 7.2 01/11/2023 06:16 AM    LABALBU 2.5 01/11/2023 06:16 AM    CALCIUM 9.1 01/11/2023 06:16 AM    BILITOT 0.6 01/11/2023 06:16 AM    ALKPHOS 101 01/11/2023 06:16 AM    AST 16 01/11/2023 06:16 AM    ALT 15 01/11/2023 06:16 AM     Ionized Calcium:  No results found for: IONCA  Magnesium:    Lab Results   Component Value Date/Time    MG 2.6 01/10/2023 04:44 PM     Phosphorus:  No results found for: PHOS  U/A:  No results found for: NITRITE, COLORU, PHUR, LABCAST, WBCUA, RBCUA, MUCUS, TRICHOMONAS, YEAST, BACTERIA, CLARITYU, SPECGRAV, LEUKOCYTESUR, UROBILINOGEN, BILIRUBINUR, BLOODU, GLUCOSEU, AMORPHOUS  Microalbumen/Creatinine ratio:  No components found for: RUCREAT  Iron Saturation:  No components found for: PERCENTFE  TIBC:  No results found for: TIBC  FERRITIN:  No results found for: FERRITIN     Imaging:  XR CHEST PORTABLE   Final Result   Trace left apical pneumothorax. Persistent atelectasis and or infiltrate at   the left base. XR CHEST PORTABLE   Final Result   Interval placement of two left chest tubes. Significantly decreased left   pleural effusion. Atelectasis in the lower left lung. No visible   pneumothorax. CT CHEST WO CONTRAST   Final Result   Moderate left pleural fluid collection with a partially loculated appearance   limited for empyema evaluation due to lack of intravenous contrast without   gross pleural wall thickening evident similar to prior comparison from   01/07/2023 with adjacent atelectasis.   No new or separate consolidation. XR CHEST PORTABLE   Final Result   Stable moderate volume left pleural effusion with adjacent atelectasis         XR CHEST PORTABLE   Final Result   Relatively stable left basilar pleural and parenchymal disease. CTA PULMONARY W CONTRAST   Final Result   1. No indication for acute pulmonary emboli. 2.  No aneurysm formation or dissection of the thoracic aorta. 3.  Moderate to large size left-sided pleural effusion causes significant   compression atelectasis of the left lower lobe with patent central airways. RECOMMENDATIONS:   Unavailable         XR CHEST PORTABLE   Final Result   Patchy infiltrates/atelectasis and pleural effusion in the left lung base   concerning for pneumonia. Underlying malignancy has to be excluded and close   surveillance recommended to see complete resolution. US RETROPERITONEAL COMPLETE    (Results Pending)   XR CHEST PORTABLE    (Results Pending)       Assessment  Acute kidney injury in the setting of volume contraction secondary to contrast induced nephropathy, decreased oral intake, and diarrhea. Creatinine on presentation was 0.8 mg/dL. Increased to 1.7 mg/dL after receiving intravenous contrast and then went to 3.5 mg/dL. He was also receiving PO lasix which has been held. Metabolic acidosis due to acute kidney injury     Renal ultrasound- unremarkable   The trajectory of azotemia is consistent with contrast inducted nephropathy . He is euvolemic  Cr already improving     Plan    Support hemodynamic   Encourage po intake   Cr already improving  Avoid nephrotoxins       Thank you for the opportunity to participate in the care of your pleasant patient. We look forward to following along with you.

## 2023-01-11 NOTE — HOME CARE
[facility] has 100% coverage for home IV.      Baron Salvatore LPN   [facility] AT [facility] abnormal/expand...

## 2023-01-11 NOTE — PLAN OF CARE
Problem: Pain  Goal: Verbalizes/displays adequate comfort level or baseline comfort level  1/10/2023 2109 by Erum Pacheco RN  Outcome: Progressing  1/10/2023 1748 by Nata Sutton RN  Outcome: Progressing

## 2023-01-11 NOTE — PROGRESS NOTES
Merissa Danielle M.D.,[facility]  Lana Blacno D.O., F.A.C.O.I., Kyara Sahu M.D. Lois Obrien M.D. Abdiel Alexander D.O. Daily Pulmonary Progress Note    Patient:  Francesca Becerra 50 y.o. male MRN: [id number]     Date of Service: 1/11/2023      Synopsis     We are following patient for pleural effusion    \"CC\" shortness of breath    Code status: Full      Subjective      Patient was seen and examined. Feeling much improved tolerated left VATS yesterday. 1 chest tube remaining in place. Minimal discomfort. Review of Systems:  Constitutional: Denies fever, weight loss, night sweats, and fatigue  Skin: Denies pigmentation, dark lesions, and rashes   HEENT: Denies hearing loss, tinnitus, ear drainage, epistaxis, sore throat, and hoarseness. Cardiovascular: Denies palpitations, chest pain, and chest pressure. Respiratory:  left sided chest discomfort at incision site.   Left VATS  Gastrointestinal: Denies nausea, vomiting, poor appetite, diarrhea, heartburn or reflux  Genitourinary: Denies dysuria, frequency, urgency or hematuria  Musculoskeletal: Denies myalgias, muscle weakness, and bone pain  Neurological: Denies dizziness, vertigo, headache, and focal weakness  Psychological: Denies anxiety and depression  Endocrine: Denies heat intolerance and cold intolerance  Hematopoietic/Lymphatic: Denies bleeding problems and blood transfusions    24-hour events:  Left VATS    Objective   Vitals: BP (!) 143/87   Pulse 83   Temp 96.9 °F (36.1 °C) (Temporal)   Resp 18   Ht 5' 9\" (1.753 m)   Wt 186 lb 6.4 oz (84.6 kg)   SpO2 96%   BMI 27.53 kg/m²     I/O:    Intake/Output Summary (Last 24 hours) at 1/11/2023 1209  Last data filed at 1/11/2023 1021  Gross per 24 hour   Intake 840 ml   Output 980 ml   Net -140 ml                          CURRENT MEDS :  Scheduled Meds:   piperacillin-tazobactam  3,375 mg IntraVENous Q8H    sodium chloride flush  5-40 mL IntraVENous 2 times per day    enoxaparin 30 mg SubCUTAneous Daily    acetaminophen  650 mg Oral Q6H    sennosides-docusate sodium  1 tablet Oral BID    ipratropium-albuterol  1 ampule Inhalation Q4H WA    mupirocin   Topical BID    buPROPion  300 mg Oral Daily    QUEtiapine  100 mg Oral QPM       Physical Exam:  General Appearance: appears comfortable in no acute distress. HEENT: Normocephalic atraumatic without obvious abnormality   Neck: Lips, mucosa, and tongue normal.  Supple, symmetrical, trachea midline, no adenopathy;thyroid:  no enlargement/tenderness/nodules or JVD. Lung: Breath sounds slightly diminished left base respirations   unlabored. Symmetrical expansion. 1 chest tube remaining to waterseal, no air leak  Heart: RRR, normal S1, S2. No MRG  Abdomen: Soft, NT, ND. BS present x 4 quadrants. No bruit or organomegaly. Extremities: Pedal pulses 2+ symmetric b/l. Extremities normal, no cyanosis, clubbing, or edema. Musculokeletal: No joint swelling, no muscle tenderness. ROM normal in all joints of extremities. Neurologic: Mental status: Alert and Oriented X3 . Pertinent/ New Labs and Imaging Studies     Imaging Personally Reviewed:  Postop chest x-ray  FINDINGS:   Stable left-sided chest tubes. Unchanged atelectasis and or infiltrate at   the left base. Trace left apical pneumothorax. Impression   Trace left apical pneumothorax. Persistent atelectasis and or infiltrate at   the left base. Ct chest non contrast 1/9/23  Moderate left pleural fluid collection with a partially loculated appearance   limited for empyema evaluation due to lack of intravenous contrast without   gross pleural wall thickening evident similar to prior comparison from   01/07/2023 with adjacent atelectasis. No new or separate consolidation. 1/9/2023 chest x-ray  FINDINGS:   Cardiac size remains enlarged with opacifications at the left lung base of   moderate left pleural effusion and adjacent atelectasis.   No pneumothorax. Degenerate changes of the spine. Impression   Stable moderate volume left pleural effusion with adjacent atelectasis                 CTA chest on 1/7/23  Impression   1. No indication for acute pulmonary emboli. 2.  No aneurysm formation or dissection of the thoracic aorta. 3.  Moderate to large size left-sided pleural effusion causes significant   compression atelectasis of the left lower lobe with patent central airways. RECOMMENDATIONS:   Unavailable         ECHO  1/9/2023-   Summary   Technically difficult study - limited visualization. Micro-bubble contrast injected to enhance left ventricular visualization. Normal left ventricular size and systolic function. Ejection fraction is visually estimated at 55%. Indeterminate diastolic function. No regional wall motion abnormalities seen. Mild left ventricular concentric hypertrophy noted. Grossly normal right ventricular size and function. Valves not well seen; no obvious abnormalities by Doppler interrogation. Labs:  Lab Results   Component Value Date/Time    WBC 10.6 01/11/2023 06:16 AM    HGB 11.6 01/11/2023 06:16 AM    HCT 36.2 01/11/2023 06:16 AM    MCV 88.7 01/11/2023 06:16 AM    MCH 28.4 01/11/2023 06:16 AM    MCHC 32.0 01/11/2023 06:16 AM    RDW 12.7 01/11/2023 06:16 AM     01/11/2023 06:16 AM    MPV 10.0 01/11/2023 06:16 AM     Lab Results   Component Value Date/Time     01/11/2023 06:16 AM    K 4.9 01/11/2023 06:16 AM    K 4.2 01/09/2023 09:05 PM     01/11/2023 06:16 AM    CO2 17 01/11/2023 06:16 AM    BUN 32 01/11/2023 06:16 AM    CREATININE 3.0 01/11/2023 06:16 AM    LABALBU 2.5 01/11/2023 06:16 AM    CALCIUM 9.1 01/11/2023 06:16 AM    LABGLOM 25 01/11/2023 06:16 AM     Lab Results   Component Value Date/Time    PROTIME 14.6 01/09/2023 07:14 PM    INR 1.3 01/09/2023 07:14 PM     No results for input(s): PROBNP in the last 72 hours.     Recent Labs     01/09/23  9621 PROCAL 0.45*       This SmartLink has not been configured with any valid records. Micro:  No results for input(s): CULTRESP in the last 72 hours. Recent Labs     01/10/23  1301   LABGRAM Refer to ordered Gram stain for results       No results for input(s): LEGUR in the last 72 hours. No results for input(s): STREPNEUMAGU in the last 72 hours. No results for input(s): LP1UAG in the last 72 hours. Pleural fluid studies consistent with exudate    Latest Reference Range & Units 1/8/23 08:30 1/8/23 09:11   Source + CELL CT/DIFF-BF  Pleural    Appearance, Fluid  Cloudy    Color, Fluid  Pink    Fluid Type  Pleural    Glucose, Fluid Not Established mg/dL 91    LD, Fluid Not Established U/L 448    RBC, Fluid /uL 17,000    Monocyte Count, Fluid % 41 (C)    Neutrophil Count, Fluid % 59    Nucl Cell, Fluid /uL 7,014 (C)    pH, Fluid   7.266   Protein, Fluid Not Established g/dL 5.4    (C): Corrected  Pleural fluid culture 1/8/2023  Component 1/8/23 0830    Gram Stain Orderable Gram stain performed on unspun fluid   Rare Polymorphonuclear leukocytes   Epithelial cells not seen   No organisms seen    1/8 cytology pleural fluid pending     Assessment:    Left lung empyema with compressive atelectasis  L VATS with decortication on 1023  Recent URI 1 month ago, recent dental procedure  Left thoracentesis 1/8/2023 fluid consistent with exudate, possible empyema vs parapneumonic effusion  Community-acquired pneumonia  Fevers  Left-sided pleuritic chest pain  Bipolar 1 disorder  Volume contraction, mild NEGRO     Plan:   Zosyn for PNA. Consult infectious disease service for further antibiotic recommendations for treatment of empyema  Repeat non contrast CT chest 1/9/2023  CT surgery for mgmt of chest tube. Await surgical cultures. Monitor off oxygen keep SPO2 greater than 92%  Off fluids, monitor renal function.  Creat 3.0.  2D echo completed 1/9/23  Analgesics as needed   DVT, GI prophylaxis  This plan of care was reviewed in collaboration with Dr. Wilfredo Hernandez   Electronically signed by Minor Credit, LYN Farah CNP on 1/11/2023 at 12:09 PM       I personally saw, examined, and cared for the patient. Labs, medications, radiographs reviewed. I agree with history exam and plans detailed in NP note. Clinically improved. Continue pulmonary recruitment maneuvers and pulmonary hygiene.     Marie Domingo, DO

## 2023-01-12 ENCOUNTER — APPOINTMENT (OUTPATIENT)
Dept: GENERAL RADIOLOGY | Age: 48
End: 2023-01-12
Payer: COMMERCIAL

## 2023-01-12 LAB
AFB SMEAR: NORMAL
AFB SMEAR: NORMAL
ANION GAP SERPL CALCULATED.3IONS-SCNC: 15 MMOL/L (ref 7–16)
BUN BLDV-MCNC: 28 MG/DL (ref 6–20)
CALCIUM SERPL-MCNC: 9.5 MG/DL (ref 8.6–10.2)
CHLORIDE BLD-SCNC: 106 MMOL/L (ref 98–107)
CO2: 23 MMOL/L (ref 22–29)
CREAT SERPL-MCNC: 2.3 MG/DL (ref 0.7–1.2)
GFR SERPL CREATININE-BSD FRML MDRD: 34 ML/MIN/1.73
GLUCOSE BLD-MCNC: 105 MG/DL (ref 74–99)
HCT VFR BLD CALC: 35.5 % (ref 37–54)
HEMOGLOBIN: 11.5 G/DL (ref 12.5–16.5)
MCH RBC QN AUTO: 28.2 PG (ref 26–35)
MCHC RBC AUTO-ENTMCNC: 32.4 % (ref 32–34.5)
MCV RBC AUTO: 87 FL (ref 80–99.9)
PDW BLD-RTO: 13.2 FL (ref 11.5–15)
PLATELET # BLD: 392 E9/L (ref 130–450)
PMV BLD AUTO: 9.8 FL (ref 7–12)
POTASSIUM SERPL-SCNC: 4.2 MMOL/L (ref 3.5–5)
RBC # BLD: 4.08 E12/L (ref 3.8–5.8)
SODIUM BLD-SCNC: 144 MMOL/L (ref 132–146)
WBC # BLD: 9 E9/L (ref 4.5–11.5)

## 2023-01-12 PROCEDURE — 76937 US GUIDE VASCULAR ACCESS: CPT

## 2023-01-12 PROCEDURE — 6370000000 HC RX 637 (ALT 250 FOR IP): Performed by: PHYSICIAN ASSISTANT

## 2023-01-12 PROCEDURE — 6370000000 HC RX 637 (ALT 250 FOR IP): Performed by: INTERNAL MEDICINE

## 2023-01-12 PROCEDURE — 2140000000 HC CCU INTERMEDIATE R&B

## 2023-01-12 PROCEDURE — 2580000003 HC RX 258: Performed by: INTERNAL MEDICINE

## 2023-01-12 PROCEDURE — 36410 VNPNXR 3YR/> PHY/QHP DX/THER: CPT

## 2023-01-12 PROCEDURE — C1751 CATH, INF, PER/CENT/MIDLINE: HCPCS

## 2023-01-12 PROCEDURE — 85027 COMPLETE CBC AUTOMATED: CPT

## 2023-01-12 PROCEDURE — 71045 X-RAY EXAM CHEST 1 VIEW: CPT

## 2023-01-12 PROCEDURE — 80048 BASIC METABOLIC PNL TOTAL CA: CPT

## 2023-01-12 PROCEDURE — 94669 MECHANICAL CHEST WALL OSCILL: CPT

## 2023-01-12 PROCEDURE — 05HC33Z INSERTION OF INFUSION DEVICE INTO LEFT BASILIC VEIN, PERCUTANEOUS APPROACH: ICD-10-PCS | Performed by: INTERNAL MEDICINE

## 2023-01-12 PROCEDURE — 94640 AIRWAY INHALATION TREATMENT: CPT

## 2023-01-12 PROCEDURE — 36415 COLL VENOUS BLD VENIPUNCTURE: CPT

## 2023-01-12 PROCEDURE — 6360000002 HC RX W HCPCS: Performed by: PHYSICIAN ASSISTANT

## 2023-01-12 PROCEDURE — 97535 SELF CARE MNGMENT TRAINING: CPT

## 2023-01-12 PROCEDURE — 6360000002 HC RX W HCPCS: Performed by: INTERNAL MEDICINE

## 2023-01-12 RX ORDER — ALBUTEROL SULFATE 90 UG/1
2 AEROSOL, METERED RESPIRATORY (INHALATION) EVERY 6 HOURS PRN
Qty: 18 G | Refills: 0 | Status: SHIPPED | OUTPATIENT
Start: 2023-01-12

## 2023-01-12 RX ORDER — GUAIFENESIN 400 MG/1
400 TABLET ORAL 3 TIMES DAILY
Status: DISCONTINUED | OUTPATIENT
Start: 2023-01-12 | End: 2023-01-14 | Stop reason: HOSPADM

## 2023-01-12 RX ORDER — OXYCODONE HYDROCHLORIDE AND ACETAMINOPHEN 5; 325 MG/1; MG/1
1 TABLET ORAL EVERY 6 HOURS PRN
Qty: 28 TABLET | Refills: 0 | Status: SHIPPED | OUTPATIENT
Start: 2023-01-12 | End: 2023-01-19

## 2023-01-12 RX ORDER — DOCUSATE SODIUM 100 MG/1
100 CAPSULE, LIQUID FILLED ORAL 2 TIMES DAILY PRN
Qty: 60 CAPSULE | Refills: 0 | Status: SHIPPED | OUTPATIENT
Start: 2023-01-12 | End: 2023-02-11

## 2023-01-12 RX ADMIN — PIPERACILLIN AND TAZOBACTAM 3375 MG: 3; .375 INJECTION, POWDER, FOR SOLUTION INTRAVENOUS at 07:35

## 2023-01-12 RX ADMIN — IPRATROPIUM BROMIDE AND ALBUTEROL SULFATE 1 AMPULE: 2.5; .5 SOLUTION RESPIRATORY (INHALATION) at 20:03

## 2023-01-12 RX ADMIN — QUETIAPINE FUMARATE 100 MG: 100 TABLET ORAL at 17:04

## 2023-01-12 RX ADMIN — MUPIROCIN: 20 OINTMENT TOPICAL at 09:09

## 2023-01-12 RX ADMIN — Medication 100 UNITS: at 21:10

## 2023-01-12 RX ADMIN — OXYCODONE AND ACETAMINOPHEN 1 TABLET: 5; 325 TABLET ORAL at 09:48

## 2023-01-12 RX ADMIN — OXYCODONE AND ACETAMINOPHEN 1 TABLET: 5; 325 TABLET ORAL at 03:13

## 2023-01-12 RX ADMIN — MUPIROCIN: 20 OINTMENT TOPICAL at 21:11

## 2023-01-12 RX ADMIN — SODIUM CHLORIDE, PRESERVATIVE FREE 10 ML: 5 INJECTION INTRAVENOUS at 09:08

## 2023-01-12 RX ADMIN — BUPROPION HYDROCHLORIDE 300 MG: 300 TABLET, FILM COATED, EXTENDED RELEASE ORAL at 09:08

## 2023-01-12 RX ADMIN — PIPERACILLIN AND TAZOBACTAM 3375 MG: 3; .375 INJECTION, POWDER, FOR SOLUTION INTRAVENOUS at 17:12

## 2023-01-12 RX ADMIN — ENOXAPARIN SODIUM 30 MG: 100 INJECTION SUBCUTANEOUS at 09:08

## 2023-01-12 RX ADMIN — IPRATROPIUM BROMIDE AND ALBUTEROL SULFATE 1 AMPULE: 2.5; .5 SOLUTION RESPIRATORY (INHALATION) at 12:52

## 2023-01-12 RX ADMIN — GUAIFENESIN 400 MG: 400 TABLET ORAL at 17:04

## 2023-01-12 RX ADMIN — IPRATROPIUM BROMIDE AND ALBUTEROL SULFATE 1 AMPULE: 2.5; .5 SOLUTION RESPIRATORY (INHALATION) at 09:58

## 2023-01-12 RX ADMIN — ACETAMINOPHEN 650 MG: 325 TABLET ORAL at 17:04

## 2023-01-12 RX ADMIN — SODIUM CHLORIDE, PRESERVATIVE FREE 10 ML: 5 INJECTION INTRAVENOUS at 21:11

## 2023-01-12 RX ADMIN — SENNOSIDES AND DOCUSATE SODIUM 1 TABLET: 50; 8.6 TABLET ORAL at 21:10

## 2023-01-12 RX ADMIN — GUAIFENESIN 400 MG: 400 TABLET ORAL at 21:10

## 2023-01-12 ASSESSMENT — PAIN SCALES - GENERAL
PAINLEVEL_OUTOF10: 6
PAINLEVEL_OUTOF10: 0
PAINLEVEL_OUTOF10: 2
PAINLEVEL_OUTOF10: 2
PAINLEVEL_OUTOF10: 4
PAINLEVEL_OUTOF10: 0
PAINLEVEL_OUTOF10: 0

## 2023-01-12 ASSESSMENT — PAIN DESCRIPTION - FREQUENCY: FREQUENCY: INTERMITTENT

## 2023-01-12 ASSESSMENT — PAIN DESCRIPTION - PAIN TYPE: TYPE: ACUTE PAIN

## 2023-01-12 ASSESSMENT — PAIN DESCRIPTION - LOCATION
LOCATION: FLANK

## 2023-01-12 ASSESSMENT — PAIN DESCRIPTION - DESCRIPTORS
DESCRIPTORS: ACHING;DISCOMFORT;DULL
DESCRIPTORS: PRESSURE
DESCRIPTORS: ACHING;DISCOMFORT;DULL

## 2023-01-12 ASSESSMENT — PAIN DESCRIPTION - ONSET: ONSET: ON-GOING

## 2023-01-12 ASSESSMENT — PAIN - FUNCTIONAL ASSESSMENT: PAIN_FUNCTIONAL_ASSESSMENT: ACTIVITIES ARE NOT PREVENTED

## 2023-01-12 ASSESSMENT — PAIN DESCRIPTION - ORIENTATION
ORIENTATION: LEFT

## 2023-01-12 NOTE — PROGRESS NOTES
MEL[facility] PROGRESS NOTE      Chief complaint: Follow-up of empyema     The patient is a 50 y.o. male with history of bipolar disorder, presented on 01/07 with pleuritic chest and back pain, seen by productive cough and congestion with low-grade fever for the past month. On admission, he was septic with fever of 101.4 °F, leukocytosis of 13,000, CT chest showing moderate to large sized left-sided pleural effusion causing significant compression atelectasis on the left lower lobe for which he underwent thoracentesis yielding 600 mL of pita/pink exudative pleural fluid with pH of 7.2. He underwent left VATS with decortication on 01/10 during which significant amount of fibropurulent exudate was noted. Pleural fluid gram stain and culture showed rare polymorphonuclear leukocytes, no epithelial cells, no organisms, no growth. Tissue gram stain and culture showed few polymorphonuclear leukocytes, no epithelial cells, no organisms, no growth to date. MRSA nares culture was negative. Piperacillin-tazobactam was started on admission. He received vancomycin from 01/08-0 1/09 and cefazolin perioperatively. Subjective: Patient was seen and examined. No chills, no abdominal pain, no diarrhea, no rash, no itching. Objective:    Vitals:    01/12/23 0741   BP: (!) 128/94   Pulse: 88   Resp: 16   Temp: 98.6 °F (37 °C)   SpO2: 95%     Constitutional: Alert, not in distress  Respiratory: Clear breath sounds, no crackles, no wheezes. Left chest tube in place  Cardiovascular: Regular rate and rhythm, no murmurs  Gastrointestinal: Bowel sounds present, soft, nontender  Skin: Warm and dry, no active dermatoses  Musculoskeletal: No joint swelling, no joint erythema    Labs, imaging, and medical records/notes were personally reviewed.     Assessment:  Empyema, left, s/p left VATS with decortication on 01/10    Recommendations:  Continue piperacillin-tazobactam 3.375 g every 8 hours for 3-4 weeks of IV antibiotic therapy from 01/10-02/07. Insert midline. Maintain midline care and monitor labs and schedule follow-up appointment per IV antibiotic protocol for OPAT as indicated on discharge instructions. Follow up with me at [facility] on 02/02. Follow-up tissue cultures. Continue supportive care. Thank you for involving me in the care of Sim Ibarra. I will continue to follow. Please do not hesitate to call for any questions or concerns.     Electronically signed by Asher Lim MD on 1/12/2023 at 9:34 AM

## 2023-01-12 NOTE — PROGRESS NOTES
Hospitalist Progress Note      Synopsis:  Briefly,Patient admitted on 2023 Mr. Alva Aceves, a 50y.o. year old male  who  has a past medical history of Bipolar 1 disorder ([facility] [address] presents with one month of intermittent cough and SOB, associated with left chest pain more upon coughing associated with low grade fever. Has sputum production but clear. Geovanny Ring left chest pain, no relieving factors, denies nausea or vomiting no abdominal pain or diarrhea no lightheadedness or tingling. . Underwent thoracentesis which he tolerated well. Concern for complicated empyema. Thoracic surgery consulted. ECHO ordered and shows EF of 55%, indeterminate diastolic function. No obvious valvular abnormalities. Patient with repeat CT that shows moderate left pleural fluid collection with partially loculated appearance. Thoracic surgery consulted and patient underwent VATS on 1/10/2023. Patient also with NEGRO. Vancomycin discontinued. Continue Zosyn renally adjusted. Start IVF. Patient received contrast administration, Lasix, and vancomycin. Nephrology consulted. Nephrology stopped IVF. Creatinine downtrending. Cardiothoracic surgery signed off. Patient for PICC line placement today  Hospital day 5     Subjective:  Patient seen and examined with friend present. Patient states he is feeling much better and is ready to be discharged. Stable overnight. No issues reported. Patient seen and examined  Records reviewed. Temp (24hrs), Av.7 °F (36.5 °C), Min:96.8 °F (36 °C), Max:98.6 °F (37 °C)    DIET: ADULT DIET;  Regular; Low Potassium (Less than 3000 mg/day)  CODE: Full Code    Intake/Output Summary (Last 24 hours) at 2023 1242  Last data filed at 2023 1007  Gross per 24 hour   Intake 470 ml   Output 2391 ml   Net -1921 ml           Objective:    /89   Pulse 92   Temp 97.7 °F (36.5 °C) (Temporal)   Resp 20   Ht 5' 9\" (1.753 m)   Wt 186 lb 6.4 oz (84.6 kg)   SpO2 95%   BMI 27.53 kg/m²     General appearance: No apparent distress, appears stated age and cooperative. HEENT: Conjunctivae/corneas clear. Mucous membranes moist.  Neck: Supple. No JVD. Respiratory:  Clear to auscultation bilaterally. Normal respiratory effort. Cardiovascular:  RRR. S1, S2 without MRG. PV: Pulses palpable. No edema. Abdomen: Soft, non-tender, non-distended. +BS  Musculoskeletal: No obvious deformities. Skin: Normal skin color. No rashes or lesions. Good turgor. Neurologic:  Grossly non-focal. Awake, alert, following commands.    Psychiatric: Alert and oriented, thought content appropriate, normal insight and judgement    Medications:  REVIEWED DAILY    Infusion Medications    sodium chloride      sodium chloride       Scheduled Medications    piperacillin-tazobactam  3,375 mg IntraVENous Q8H    lidocaine  5 mL IntraDERmal Once    sodium chloride flush  5-40 mL IntraVENous 2 times per day    heparin flush  1 mL IntraVENous 2 times per day    sodium chloride flush  5-40 mL IntraVENous 2 times per day    enoxaparin  30 mg SubCUTAneous Daily    acetaminophen  650 mg Oral Q6H    sennosides-docusate sodium  1 tablet Oral BID    ipratropium-albuterol  1 ampule Inhalation Q4H WA    mupirocin   Topical BID    buPROPion  300 mg Oral Daily    QUEtiapine  100 mg Oral QPM     PRN Meds: magnesium hydroxide, sodium chloride flush, sodium chloride, heparin flush, sodium chloride flush, sodium chloride, ondansetron, bisacodyl, morphine, sodium chloride, trimethobenzamide, oxyCODONE-acetaminophen, medicated lip balm    Labs:     Recent Labs     01/10/23  1644 01/11/23  0616 01/12/23  0614   WBC 12.1* 10.6 9.0   HGB 11.7* 11.6* 11.5*   HCT 35.9* 36.2* 35.5*    371 392       Recent Labs     01/10/23  1644 01/11/23  0616 01/12/23  0614    134 144   K 5.0 4.9 4.2    101 106   CO2 16* 17* 23   BUN 29* 32* 28*   CREATININE 3.2* 3.0* 2.3*   CALCIUM 8.5* 9.1 9.5       Recent Labs     01/10/23  0615 01/11/23  0616   PROT 7.1 7.2 ALKPHOS 118 101   ALT 17 15   AST 14 16   BILITOT 0.7 0.6       Recent Labs     01/09/23  1914   INR 1.3       No results for input(s): Po Ruff in the last 72 hours. Chronic labs:    Lab Results   Component Value Date    INR 1.3 01/09/2023       Radiology: REVIEWED DAILY    Assessment:  Empyema  Acute kidney injury  Depression  Plan:  Status postthoracentesis 1/8/2023  Reviewed echo revealed EF 55%  VATS procedure on 1/10/2023  Nephrology consulted for NEGRO  Infectious disease consulted  Currently awaiting PICC line placement      DVT Prophylaxis [] Lovenox  []  Heparin [] DOAC [] PCDs [x] Ambulation    GI Prophylaxis [] PPI  [] H2 Blocker   [] Carafate  [x] Diet/Tube Feeds   Level of care [] Med/Surg  [x] Intermediate  []  ICU   Diet ADULT DIET; Regular; Low Potassium (Less than 3000 mg/day)    Family contact [x]  N/A    [] At bedside  [] Phone call   Disposition Patient requires continued admission awaiting PICC line placement and final recommendations for IV antibiotics, likely can be discharged in a.m. MDM [] Low    [x] Moderate  []   High       Discharge Plan: To home when clinically stable, pending finalized recommendations from ID and further recommendations from nephrology.    +++++++++++++++++++++++++++++++++++++++++++++++++  Anette Messer[address]  +++++++++++++++++++++++++++++++++++++++++++++++++  NOTE: This report was transcribed using voice recognition software. Every effort was made to ensure accuracy; however, inadvertent computerized transcription errors may be present.

## 2023-01-12 NOTE — PROGRESS NOTES
Occupational Therapy  OCCUPATIONAL THERAPY treatment note  [facility] [address]  [address] [place] [address]      [id number]                                                Patient Name: Francesca Becerra  MRN: [id number]  : 1975  Room: [address] [place] [address]A    Per eval:  Evaluating OT: Dionicio Basurto OTR/L #[id number]     Referring Provider: Nellie Araujo DO  Specific Provider Orders/Date: OT eval and treat 23  Recommended Adaptive Equipment:  none     Diagnosis: Acute chest pain [R07.9]  Pleural effusion on left [J90]  Pleural effusion [J90]   Pt admitted to hospital with chest pain, SOB, and back pain     Surgery / Procedure: thoracentesis 23   L VATS 1/10/23     Pertinent Medical History:  has a past medical history of Bipolar 1 disorder ([facility] [address]        Precautions:  Fall Risk, L chest tube to suction    Assessment of current deficits    [x] Functional mobility  [x]ADLs  [x] Strength               []Cognition    [x] Functional transfers   [x] IADLs         [x] Safety Awareness   [x]Endurance    [] Fine Coordination              [x] Balance      [] Vision/perception   []Sensation     []Gross Motor Coordination  [] ROM  [] Delirium                   [] Motor Control     OT PLAN OF CARE   OT POC based on physician orders, patient diagnosis and results of clinical assessment    Frequency/Duration 1-3 days/wk for 2 weeks PRN   Specific OT Treatment Interventions to include:   * Instruction/training on adapted ADL techniques and AE recommendations to increase functional independence within precautions       * Training on energy conservation strategies, correct breathing pattern and techniques to improve independence/tolerance for self-care routine  * Functional transfer/mobility training/DME recommendations for increased independence, safety, and fall prevention  * Patient/Family education to increase follow through with safety techniques and functional independence  * Recommendation of environmental modifications for increased safety with functional transfers/mobility and ADLs  * Therapeutic exercise to improve motor endurance, ROM, and functional strength for ADLs/functional transfers  * Therapeutic activities to facilitate/challenge dynamic balance, stand tolerance for increased safety and independence with ADLs      Home Living: Pt lives with cuong in a 1 story home with 1 LEATHA    Bathroom setup: tub/shower combo    Equipment owned: none    Prior Level of Function: Independent with ADLs , Independent  with IADLs; ambulated without AD   Driving: yes    Pain Level: Pt reports L chest tube site pain rated at 4/10;  Therapist facilitated repositioning to address pain      Cognition: A&O: 4/4; Follows 2 step directions   Memory:  good   Sequencing:  good   Problem solving:  fair+   Judgement/safety:  fair     Functional Assessment:  AM-PAC Daily Activity Raw Score: 19/24   Initial Eval Status  Date: 1/11/23 Treatment Status  Date:1/12 STGs = LTGs  Time frame: 10-14 days   Feeding Independent      Grooming Supervision  SUP (standing at sink) Independent    UB Dressing Supervision       Cuing on technique due to chest tube site pain     SUP Independent    LB Dressing Supervision     Figure 4 technique  SUP (pt donned socks seated EOB) Independent    Bathing Supervision SBA (simulated, pt declined and said fiance will assist) Independent    Toileting Supervision  SBA Independent    Bed Mobility  Mod I     Functional Transfers Supervision  SUP Independent    Functional Mobility Supervision     Ambulated in room and hallway without AD SUP (using no AD, ~household distance) Independent    Balance Sitting:     Static:  indep    Dynamic:indep  Standing: sup SUP    Activity Tolerance F-     Visual/  Perceptual wfl                    Hand Dominance right   Strength ROM Additional Info:    RUE   wfl wfl good  and wfl FMC/dexterity noted during ADL tasks     LUE wfl wfl good  and wfl FMC/dexterity noted during ADL tasks     Hearing: wfl  Sensation:wfl  Tone: wfl  Edema:none noted     Comments: Upon arrival patient supine in bed and agreeable to OT Session. Fiance present. At end of session, patient semi-supine in bed with call light and phone within reach, all lines and tubes intact. Treatment: Completed ADLs/functional transfers, see above for assessment.      -pt has made good progress toward goals  -continue current POC    Time In: 0920  Time Out: 0935  Total Treatment Time: 15    Min Units   OT Eval Low 69914     OT Eval Medium 71165     OT Eval High [id number]     OT Re-Eval [id number]     Therapeutic Ex [address] [facility] [id number]     ADL/Self Care 88 [phone] 1   Orthotic Management [facility]       Manual [id number]     Neuro Re-Ed [address]       Non-Billable Time          [place] OTR/L [id number]

## 2023-01-12 NOTE — PATIENT CARE CONFERENCE
[facility] Quality Flow/Interdisciplinary Rounds Progress Note        Quality Flow Rounds held on January 12, 2023    Disciplines Attending:  Bedside Nurse, , , and Nursing Unit Leadership    Alva Aceves was admitted on 1/7/2023 10:08 AM    Anticipated Discharge Date:  Expected Discharge Date: 01/13/23    Disposition:    Alonzo Score:  Alonzo Scale Score: 20    Readmission Risk              Risk of Unplanned Readmission:  16           Discussed patient goal for the day, patient clinical progression, and barriers to discharge.   The following Goal(s) of the Day/Commitment(s) have been identified:  Diagnostics - Report Results      Yaneth Nix RN  January 12, 2023

## 2023-01-12 NOTE — PROGRESS NOTES
POD#2 Awake, alert. No complaints. Denies CP, palpitations, SOB at rest, dizziness/lightheadedness. Vitals:    01/11/23 2210 01/11/23 2314 01/12/23 0308 01/12/23 0741   BP:  126/74 (!) 128/98 (!) 128/94   Pulse:  86 94 88   Resp: 14 14 16 16   Temp:  97.3 °F (36.3 °C) 97.9 °F (36.6 °C) 98.6 °F (37 °C)   TempSrc:  Temporal Temporal Temporal   SpO2:  92% 97% 95%   Weight:       Height:         O2: None  95%      Intake/Output Summary (Last 24 hours) at 1/12/2023 0812  Last data filed at 1/12/2023 0631  Gross per 24 hour   Intake 590 ml   Output 2636 ml   Net -2046 ml           UO: 1100mL/8hr   CT output: Pleural: 40mL/8hr (40mL/24hrs)      Recent Labs     01/10/23  1644 01/11/23  0616 01/12/23  0614   WBC 12.1* 10.6 9.0   HGB 11.7* 11.6* 11.5*   HCT 35.9* 36.2* 35.5*    371 392        Recent Labs     01/10/23  1644 01/11/23  0616 01/12/23  0614   BUN 29* 32* 28*   CREATININE 3.2* 3.0* 2.3*           Telemetry: SR      Physical Exam  Constitutional:       General: He is not in acute distress. Cardiovascular:      Rate and Rhythm: Normal rate and regular rhythm. Heart sounds: Normal heart sounds. Pulmonary:      Effort: No respiratory distress. Breath sounds: Normal breath sounds. Comments: CT x1 in place and secure. Abdominal:      General: There is no distension. Palpations: Abdomen is soft. Skin:     General: Skin is warm and dry. Neurological:      Mental Status: He is alert and oriented to person, place, and time. Psychiatric:         Mood and Affect: Mood normal.         Behavior: Behavior normal.           POD#2    A/P:     1) Empyema  --Stable s/p Left VATS, decortication on 1/10/2022  --Maintaining NSR  --VSS  --Antibiotics per ID   --On RA  --chest tubes without significant output and without airleaks. Remaining chest tube removed without difficulty. Patient tolerated well.    --Increase activity as tolerated  --Encourage incentive spirometry   --lovenox for dvt prophylaxis      2) Expected acute post operative anemia secondary to surgery  --stable - hgb 11.5        3) Constipation--expected delayed return of bowel function  --secondary to anesthesia, narcotics, decreased oral intake, and decreased physical activity   --continue ordered bowel regimen      4) NEGRO   --Renal following   --Scr 2.3 today  --avoid nephrotoxins and hypotension      CTS to sign off. Please don't hesitate to call with any questions or concerns.  Follow up in 2 weeks    Future Appointments   Date Time Provider Louis Fraser   1/24/2023 12:45  [facility]         This patient's case and care plan was discussed with the attending surgeon

## 2023-01-12 NOTE — PROGRESS NOTES
[facility] MD Lynnette Murphy MD Domnick Porch, MD Alta Churches, NERY Zhu, CALVIN Valdez, NERY  Progress Note    1/12/2023    SUBJECTIVE:   1/12: Sitting up in bed. No acute distress. Chest tube was removed earlier today. Denies chest pain, dyspnea, or palpitations. Appetite is good. On room air. PROBLEM LIST:    Principal Problem:    Pleural effusion on left  Active Problems:    Pleural effusion    Empyema (HCC)  Resolved Problems:    * No resolved hospital problems. *         DIET:    ADULT DIET;  Regular; Low Potassium (Less than 3000 mg/day)     MEDS (scheduled):    guaiFENesin  400 mg Oral TID    piperacillin-tazobactam  3,375 mg IntraVENous Q8H    lidocaine  5 mL IntraDERmal Once    sodium chloride flush  5-40 mL IntraVENous 2 times per day    heparin flush  1 mL IntraVENous 2 times per day    sodium chloride flush  5-40 mL IntraVENous 2 times per day    enoxaparin  30 mg SubCUTAneous Daily    acetaminophen  650 mg Oral Q6H    sennosides-docusate sodium  1 tablet Oral BID    ipratropium-albuterol  1 ampule Inhalation Q4H WA    mupirocin   Topical BID    buPROPion  300 mg Oral Daily    QUEtiapine  100 mg Oral QPM       MEDS (infusions):   sodium chloride      sodium chloride         MEDS (prn):  magnesium hydroxide, sodium chloride flush, sodium chloride, heparin flush, sodium chloride flush, sodium chloride, ondansetron, bisacodyl, morphine, sodium chloride, trimethobenzamide, oxyCODONE-acetaminophen, medicated lip balm    PHYSICAL EXAM:     Patient Vitals for the past 24 hrs:   BP Temp Temp src Pulse Resp SpO2   01/12/23 1158 138/89 97.7 °F (36.5 °C) Temporal 92 20 95 %   01/12/23 1018 -- -- -- -- 18 --   01/12/23 1000 -- -- -- -- -- 93 %   01/12/23 0948 -- -- -- -- 16 --   01/12/23 0741 (!) 128/94 98.6 °F (37 °C) Temporal 88 16 95 %   01/12/23 0308 (!) 128/98 97.9 °F (36.6 °C) Temporal 94 16 97 %   01/11/23 2314 126/74 97.3 °F (36.3 °C) Temporal 86 14 92 %   01/11/23 2210 -- -- -- -- 14 --   01/11/23 1918 (!) 140/83 97.7 °F (36.5 °C) Temporal 92 20 95 %   01/11/23 1803 -- -- -- -- 20 --   01/11/23 1709 (!) 135/93 -- -- -- -- --   @      Intake/Output Summary (Last 24 hours) at 1/12/2023 1625  Last data filed at 1/12/2023 1359  Gross per 24 hour   Intake 1380 ml   Output 2091 ml   Net -711 ml         Wt Readings from Last 3 Encounters:   01/10/23 186 lb 6.4 oz (84.6 kg)   06/11/18 190 lb (86.2 kg)       Constitutional:  in no acute distress  HEENT: NC/AT, EOMI, sclera and conjunctiva are clear and anicteric, mucus membranes moist  Neck: Trachea midline, no JVD  Cardiovascular: S1, S2 regular rhythm, no murmur,or rub  Respiratory:  CTAB, diminished in left lung base. No crackles, no wheeze  Gastrointestinal:  Soft, nontender, nondistended, NABS  Ext: no edema, feet warm  Skin: dry, no rash  Neuro: awake, alert, interactive      DATA:    Recent Labs     01/10/23  1644 01/11/23  0616 01/12/23  0614   WBC 12.1* 10.6 9.0   HGB 11.7* 11.6* 11.5*   HCT 35.9* 36.2* 35.5*   MCV 86.1 88.7 87.0    371 392     Recent Labs     01/10/23  0615 01/10/23  0756 01/10/23  1420 01/10/23  1644 01/11/23  0616 01/12/23  0614      < > 139 138 134 144   K 4.1   < > 4.8 5.0 4.9 4.2      < > 106 106 101 106   CO2 20*   < > 16* 16* 17* 23   MG  --   --  2.2 2.6  --   --    BUN 26*   < > 26* 29* 32* 28*   CREATININE 3.5*   < > 3.3* 3.2* 3.0* 2.3*   ALT 17  --   --   --  15  --    AST 14  --   --   --  16  --    BILIDIR  --   --   --   --  0.3  --    BILITOT 0.7  --   --   --  0.6  --    ALKPHOS 118  --   --   --  101  --     < > = values in this interval not displayed. Lab Results   Component Value Date    LABPROT 0.3 (H) 01/11/2023    LABPROT 0.3 01/11/2023       ASSESSMENT :  Acute kidney injury in the setting of volume contraction secondary to contrast induced nephropathy, decreased oral intake, and diarrhea. Creatinine on presentation was 0.8 mg/dL. Increased to 1.7 mg/dL after receiving intravenous contrast and then went to 3.5 mg/dL. He was also receiving PO lasix which has been held. Metabolic acidosis due to acute kidney injury      Renal ultrasound- unremarkable   The trajectory of azotemia is consistent with contrast inducted nephropathy .    He is euvolemic  Cr already improving     Cr 3.0-->2.3 mg/dL     Plan:    Support hemodynamic   Encourage po intake   Cr already improving  Avoid nephrotoxins      Electronically signed by LYN Lacey CNP on 1/12/2023 at 4:25 PM

## 2023-01-12 NOTE — DISCHARGE INSTRUCTIONS
Discharge Instructions for Thoracic Surgery    What you will need at home:                          *  Digital Thermometer               *  Antibacterial Soap                *  Clean Wash Cloths             *  Someone to be with you for one week                NEVER SMOKE AGAIN! Absolutely no tobacco products! Do not allow others to smoke around you. Second hand smoke can be just as bad. The [facility] [facility] has a free program available, call [phone]. Activity:  Plan to walk indoors on days the temperature is below 40? or over 80? or during smog alerts. At first limit your stair climbing to once or twice a day. You may use the handrail for balance only. It is not unusual to feel tired for the first few weeks, but walking builds up your strength. Driving:  Do not drive while on pain medication. Incentive Spirometer:  Continue to use your lung exerciser for the next 4 weeks. Support your chest and cough each time you complete the 10 exercises. Medication:    Pain pills are ordered to keep you comfortable and able to increase activity         level. Your need for pain pills should decrease over the next 2 weeks. For mild pain you take Tylenol, but do not exceed 4000mg of Tylenol a day. Vicodin contains Tylenol,  so watch how much Tylenol (Acetaminophen) you take  Stool Softners: You may have been prescribed Colace (docusate), to help prevent straining with bowel movements. If your bowels have not moved by the second day home, take a laxative of your choice. If no bowel movement by the third day, call your surgeon. If you find you have the opposite problem and your stools are too soft, stop taking the stool softener. Avoid herbal, dietary products and vitamins unless OKd by your surgeon. If on Coumadin monitor Vitamin K intake and keep it consistent.             Call during business hours Monday through Friday for medication refills. [phone]      Incision Care:  [facility] YOUR INCISIONS DAILY WITH A CLEAN WASHCLOTH AND ANTIBACTERIAL SOAP. Do not wash your incisions after you have cleansed other parts of your body. You may shower but keep your back to the spray. Avoid hot water, comfortably warm is OK. ? If you went home with a dressing on any incision: Remove the dressing daily     and wash the incision with antibacterial soap and water and pat dry. Only     cover the incision with a dressing if it is draining. Otherwise leave it     uncovered (unless your doctor told you otherwise)  ? No tub baths until your incisions are healed. ? DO NOT APPLY ANYTHING OTHER THAN ANTIBACTERIAL SOAP AND     WATER TO YOUR INCISIONS. Do not apply lotions, creams, ointments,     hydrogen peroxide or powder. ? Keep your incisions CLEAN. Think CLEAN. No one should touch your     incisions without washing their hands first.  Do not let pets touch your incisions     (have incisions covered with clothing when around pets). ? Wear loose clothing. For support women should wear a bra. HANNA Hose (white stockings): Should be worn during the day and off at night. Someone other than the patient will need to put on and take off the hose. It is too much pulling and tugging for the patient. Expect them to be difficult to put on and they should feel snug. These are usually worn for 2-4 weeks. Wash them by hand to keep their elasticity. Diet:  Eat a low fat, low salt diet. Eat a high fiber diet to avoid constipation and straining during bowel movements (whole grains, raw veggies, fruits)    Diabetics:  Check blood sugars twice a day. Contact your primary care physician if your blood sugar is consistently above 130. Good tissue healing occurs when blood sugars are less than 130. Sexual Activity: When you can climb 2 flights of stairs without chest pain, shortness of breath or fatigue, it is generally OK to resume sexual activity. Depression:   It is not unusual to have feelings of anxiety, fear or depression after surgery. If you need help with these feelings, call your primary care physician. There are medications to help and healing usually occurs sooner is youre not depressed. When to call the doctor:  [phone]    If you have sudden, severe shortness of breath or shortness of breath while resting. Pain, tenderness, warmth or redness in your calf. If your heart rate is below 50 or over 110 beats per minute, or symptoms of fluttering in your chest or irregular pulse. Temperature (taken daily) greater than 101? Nel Hank If your bowels have not moved by the third day home. Persistent diarrhea, nausea or vomiting. If you are unable to eat, or unable to drink 5 glasses of fluid a day for more than one day. For redness, warmth, tenderness, drainage or swelling of any incision. For ANY chest incision drainage. If you have pain not relieved by pain medication. If you experience the same pain or other symptoms that brought you to the hospital or doctor before surgery. Diabetics:  Call Primary Care Physician for blood sugars consistently above 130. Depression:  Call Primary Care Physician if feelings of depression persist.      If you think something is seriously wrong go to the nearest emergency room! Call 911 for any EMERGENCY and go to the nearest hospital!     Future Appointments   Date Time Provider Louis Fraser   1/24/2023 12:45 PM Saranya Nielson, DO CARDIO SURG [facility]     [facility] IV ANTIBIOTIC PROTOCOL FOR ANTIBIOTICS OTHER THAN VANCOMYCIN, DAPTOMYCIN, OR AMINOGLYCOSIDES    VASCULAR ACCESS DEVICES OR VADs (TLC, PICC, Midline, etc.):   1. VADs will be replaced as necessary. 2.  Draw all blood work from VADs, except for drug levels. 3.  If unable to access a VAD, insert a peripheral catheter temporarily. Contact the Primary Care Physician or [facility] office for surgical referral.  4.   VAD Dressing Change Protocol    - Cleanse insertion site with Shur-Clens® or equivalent three times.    - Secure catheter with Steri-Strips®, Bone®, or equivalent securing device. - Apply Opsite® 3000 or equivalent transparent dressing.    - Change dressing twice weekly, maintaining sterile technique. If there is a BioPatch®, SilverSite® or equivalent, change once weekly only or as needed. 5. For occluded VAD: instill alteplase (Cathflo®) 2 mg into occluded catheter but do not force solution into catheter. Let dwell x 30 minutes then check for patency, if not patent, let dwell for another 90 minutes then check patency. If still not patent, instill another 2 mg and repeat above. If still catheter still not patent, contact physician. If not using premixed 1 mg syringe, dilute each vial with 2.2 mL sterile water to final concentration of 1 mg/mL. Mix by gently swirling until completely dissolved. Do not shake. 6.  Remove VAD upon completion of IV antibiotics, unless otherwise specified by the ordering physician. When PICC or VAD is to be removed, documentation of length of inserted PICC. PICC or VAD length is to correlate with inserted length and sent to physician at the time of removal.  If VAD cannot be removed, schedule appointment at office for removal.    ROUTINE LABS TO BE ORDERED AND DRAWN:   1. Infusion Center to call all abnormal lab values to the physician  2. Fax all labs to the office in a timely manner, during office hours. 3.  Twice weekly (preferably every Monday & Thursday):    - BUN Creatinine and liver panel    - Complete Blood Count with differential (CBC with dif)  4. Once weekly:    - C-Reactive Protein (not high sensitivity CRP)    - Erythrocyte/Westergren Sedimentation Rate (WSR or ESR)  5. When clinically indicated obtain:    - Urine culture - if the patient has a fever with purulent drainage from Peterson or suprapubic catheter, or foul smelling urine. Do not irrigate a clogged Peterson catheter. Replace it.     - Blood cultures and Wound Gram stain with culture & sensitivity - if the patient has a fever or increasing drainage or foul odor from a wound. Notify the treating physician in a timely manner    - Stool specimen - if diarrhea occurs while on antibiotics, send stools for C. difficile and WBCs. 6.  When a drug is discontinued due to a low white blood cell count (WBCs) draw CBC with differential and CMP twice a week x 2 measurements. NOTIFICATION AND FOLLOW UP WITH INFECTIOUS DISEASE PHYSICIAN:  1. Notify ordering physician or office if patient requires admission to the hospital with reason for admission. 2.  Discontinue all blood work upon completion of IV antibiotics, unless otherwise specified by ordering physician. 3.  Notify ordering physician if the patient does not receive the scheduled antibiotic for 24 hours or more. 5.  Ensure patient has an appointment to follow up with the Infectious Disease physician within 2 weeks of discharge from the hospital or initiation of IV antibiotic therapy. 6.  Continue IV antibiotic therapy until patient is seen in the office or unless specific stop date is noted on the original order or unless otherwise ordered by physician. Contact the Infectious Disease physicians office to ensure stop date is correct before stopping antibiotics.

## 2023-01-12 NOTE — CARE COORDINATION
CT removed. CTS signed off. Per ID, IV Zosyn 3,375mg q 8 hrs 01/10-02/07. Cr 2.6- nephrology following. On RA. Called and spoke with Niki Howard at [facility]. Pt was unable to be seen until Tuesday 01/17. I asked if pt could be seen earlier. Per Jarret Chiang [facility] can now see pt Saturday 01/14. Pt will need to get his am does of iv Zosyn before discharge on Saturday. Niki Howard asked if [facility] could be notified on Saturday what time next does of zosyn will be due. Met with pt and pt's fiance in room. Informed them [facility] is now able to see pt on Saturday. Sw/maren will follow.

## 2023-01-12 NOTE — PROGRESS NOTES
Padmini Chaney M.D.,[facility]  Cielo Romero D.O., F.A.C.O.I., Kraig Leal M.D. Gisel Headley M.D. Abigail Fung D.O. Daily Pulmonary Progress Note    Patient:  Darleen Sheikh 50 y.o. male MRN: [id number]     Date of Service: 1/12/2023      Synopsis     We are following patient for pleural effusion    \"CC\" shortness of breath    Code status: Full      Subjective      Patient was seen and examined. Feeling much improved tolerated left VATS 1/10/23. Minimal discomfort. Increasing activity. Chest tube removal  today per surgery team today. o nly 40 cc output recorded yesterday. Renal function improving, creat down to 2.3. ID input noted, anticipate 2-3 wks abx with Zosyn. Review of Systems:  Constitutional: Denies fever, weight loss, night sweats, and fatigue  Skin: Denies pigmentation, dark lesions, and rashes   HEENT: Denies hearing loss, tinnitus, ear drainage, epistaxis, sore throat, and hoarseness. Cardiovascular: Denies palpitations, chest pain, and chest pressure. Respiratory:  left sided chest discomfort at incision site.   Left VATS  Gastrointestinal: Denies nausea, vomiting, poor appetite, diarrhea, heartburn or reflux  Genitourinary: Denies dysuria, frequency, urgency or hematuria  Musculoskeletal: Denies myalgias, muscle weakness, and bone pain  Neurological: Denies dizziness, vertigo, headache, and focal weakness  Psychological: Denies anxiety and depression  Endocrine: Denies heat intolerance and cold intolerance  Hematopoietic/Lymphatic: Denies bleeding problems and blood transfusions    24-hour events:  None     Objective   Vitals: BP (!) 128/94   Pulse 88   Temp 98.6 °F (37 °C) (Temporal)   Resp 16   Ht 5' 9\" (1.753 m)   Wt 186 lb 6.4 oz (84.6 kg)   SpO2 95%   BMI 27.53 kg/m²     I/O:    Intake/Output Summary (Last 24 hours) at 1/12/2023 0846  Last data filed at 1/12/2023 0844  Gross per 24 hour   Intake 590 ml   Output 2936 ml   Net -2346 ml CURRENT MEDS :  Scheduled Meds:   piperacillin-tazobactam  3,375 mg IntraVENous Q8H    lidocaine  5 mL IntraDERmal Once    sodium chloride flush  5-40 mL IntraVENous 2 times per day    heparin flush  1 mL IntraVENous 2 times per day    sodium chloride flush  5-40 mL IntraVENous 2 times per day    enoxaparin  30 mg SubCUTAneous Daily    acetaminophen  650 mg Oral Q6H    sennosides-docusate sodium  1 tablet Oral BID    ipratropium-albuterol  1 ampule Inhalation Q4H WA    mupirocin   Topical BID    buPROPion  300 mg Oral Daily    QUEtiapine  100 mg Oral QPM       Physical Exam:  General Appearance: appears comfortable in no acute distress. HEENT: Normocephalic atraumatic without obvious abnormality   Neck: Lips, mucosa, and tongue normal.  Supple, symmetrical, trachea midline, no adenopathy;thyroid:  no enlargement/tenderness/nodules or JVD. Lung: Breath sounds slightly diminished left base respirations   unlabored. Symmetrical expansion. Heart: RRR, normal S1, S2. No MRG  Abdomen: Soft, NT, ND. BS present x 4 quadrants. No bruit or organomegaly. Extremities: Pedal pulses 2+ symmetric b/l. Extremities normal, no cyanosis, clubbing, or edema. Musculokeletal: No joint swelling, no muscle tenderness. ROM normal in all joints of extremities. Neurologic: Mental status: Alert and Oriented X3 . Pertinent/ New Labs and Imaging Studies     Imaging Personally Reviewed:  Postop chest x-ray  FINDINGS:   Stable left-sided chest tubes. Unchanged atelectasis and or infiltrate at   the left base. Trace left apical pneumothorax. Impression   Trace left apical pneumothorax. Persistent atelectasis and or infiltrate at   the left base.                Ct chest non contrast 1/9/23  Moderate left pleural fluid collection with a partially loculated appearance   limited for empyema evaluation due to lack of intravenous contrast without   gross pleural wall thickening evident similar to prior comparison from 01/07/2023 with adjacent atelectasis. No new or separate consolidation. 1/9/2023 chest x-ray  FINDINGS:   Cardiac size remains enlarged with opacifications at the left lung base of   moderate left pleural effusion and adjacent atelectasis. No pneumothorax. Degenerate changes of the spine. Impression   Stable moderate volume left pleural effusion with adjacent atelectasis                 CTA chest on 1/7/23  Impression   1. No indication for acute pulmonary emboli. 2.  No aneurysm formation or dissection of the thoracic aorta. 3.  Moderate to large size left-sided pleural effusion causes significant   compression atelectasis of the left lower lobe with patent central airways. RECOMMENDATIONS:   Unavailable         ECHO  1/9/2023-   Summary   Technically difficult study - limited visualization. Micro-bubble contrast injected to enhance left ventricular visualization. Normal left ventricular size and systolic function. Ejection fraction is visually estimated at 55%. Indeterminate diastolic function. No regional wall motion abnormalities seen. Mild left ventricular concentric hypertrophy noted. Grossly normal right ventricular size and function. Valves not well seen; no obvious abnormalities by Doppler interrogation.     Labs:  Lab Results   Component Value Date/Time    WBC 9.0 01/12/2023 06:14 AM    HGB 11.5 01/12/2023 06:14 AM    HCT 35.5 01/12/2023 06:14 AM    MCV 87.0 01/12/2023 06:14 AM    MCH 28.2 01/12/2023 06:14 AM    MCHC 32.4 01/12/2023 06:14 AM    RDW 13.2 01/12/2023 06:14 AM     01/12/2023 06:14 AM    MPV 9.8 01/12/2023 06:14 AM     Lab Results   Component Value Date/Time     01/12/2023 06:14 AM    K 4.2 01/12/2023 06:14 AM    K 4.2 01/09/2023 09:05 PM     01/12/2023 06:14 AM    CO2 23 01/12/2023 06:14 AM    BUN 28 01/12/2023 06:14 AM    CREATININE 2.3 01/12/2023 06:14 AM    LABALBU 2.5 01/11/2023 06:16 AM    CALCIUM 9.5 01/12/2023 06:14 AM    LABGLOM 34 01/12/2023 06:14 AM     Lab Results   Component Value Date/Time    PROTIME 14.6 01/09/2023 07:14 PM    INR 1.3 01/09/2023 07:14 PM     No results for input(s): PROBNP in the last 72 hours. No results for input(s): PROCAL in the last 72 hours. This SmartLink has not been configured with any valid records. Micro:  No results for input(s): CULTRESP in the last 72 hours. Recent Labs     01/10/23  1301   LABGRAM Refer to ordered Gram stain for results       No results for input(s): LEGUR in the last 72 hours. No results for input(s): STREPNEUMAGU in the last 72 hours. No results for input(s): LP1UAG in the last 72 hours.     Pleural fluid studies consistent with exudate    Latest Reference Range & Units 1/8/23 08:30 1/8/23 09:11   Source + CELL CT/DIFF-BF  Pleural    Appearance, Fluid  Cloudy    Color, Fluid  Pink    Fluid Type  Pleural    Glucose, Fluid Not Established mg/dL 91    LD, Fluid Not Established U/L 448    RBC, Fluid /uL 17,000    Monocyte Count, Fluid % 41 (C)    Neutrophil Count, Fluid % 59    Nucl Cell, Fluid /uL 7,014 (C)    pH, Fluid   7.266   Protein, Fluid Not Established g/dL 5.4    (C): Corrected  Pleural fluid culture 1/8/2023 thoracentesis  Body Fluid Culture, Sterile Growth not present      Component 1/8/23 0830    Gram Stain Orderable Gram stain performed on unspun fluid   Rare Polymorphonuclear leukocytes   Epithelial cells not seen   No organisms seen    1/8 cytology pleural fluid NEGATIVE    1/10/23 surgical cultures  Value: Growth not present, incubation continues   24 Hours growth not present; incubation continues       Orderable Gram stain performed from tissue touch prep   Few Polymorphonuclear leukocytes   Epithelial cells not seen   No organisms seen      Assessment:    Left lung empyema with compressive atelectasis  L VATS with decortication on 1/10/23  Recent URI 1 month ago, recent dental procedure  Left thoracentesis 1/8/2023 fluid consistent with exudate, possible empyema vs parapneumonic effusion  Community-acquired pneumonia  Fevers  Left-sided pleuritic chest pain  Bipolar 1 disorder  Volume contraction, mild NEGRO     Plan:   Zosyn for empyema. Per ID recommendations 3-4 wks abx recommended 1/10-2/07/23. Midline today prior to dc  CT surgery for mgmt of chest tube. Await final surgical cultures 1/10/23. 1/8/23 fluid cytology from thoracentesis negative. Monitor off oxygen keep SPO2 greater than 92%  Off fluids, monitor renal function-improving. Creat 2.3  2D echo completed 1/9/23  Analgesics as needed   DVT, GI prophylaxis  Ok to dc from a pulmonary perspective when ok with all others and home care arranged for antibiotics. Follow up with repeat imaging as OP 2-3 wks , message routed  This plan of care was reviewed in collaboration with Dr. Sin Ramirez   Electronically signed by LYN Ingram CNP on 1/12/2023 at 8:46 AM       I personally saw, examined, and cared for the patient. Labs, medications, radiographs reviewed. I agree with history exam and plans detailed in NP note. Clinically improving. Still has some chest congestion. Continue bronchopulmonary hygiene. Add flutter valve and Mucinex. Will repeat CT chest in about 2-3 weeks with follow-up.   Will be on IV antibiotics per ID for about 4 weeks    Chris Israel DO

## 2023-01-12 NOTE — PROGRESS NOTES
Power midline Placement 1/12/2023    Product number: [id number]   Lot Number: [id number]      Ultrasound: yes   Left Basilic vein:                Upper Arm Circumference: 34 cm    Size: 4.5 Fr Sl    Exposed Length: 3 cm    Internal Length: 12 cm   Cut: 0 cm   Vein Measurement: 0.46 cm    Kareem Goode RN  1/12/2023  4:49 PM

## 2023-01-13 ENCOUNTER — APPOINTMENT (OUTPATIENT)
Dept: GENERAL RADIOLOGY | Age: 48
End: 2023-01-13
Payer: COMMERCIAL

## 2023-01-13 LAB
ANION GAP SERPL CALCULATED.3IONS-SCNC: 12 MMOL/L (ref 7–16)
BLOOD BANK DISPENSE STATUS: NORMAL
BLOOD BANK PRODUCT CODE: NORMAL
BODY FLUID CULTURE, STERILE: NORMAL
BPU ID: NORMAL
BUN BLDV-MCNC: 22 MG/DL (ref 6–20)
CALCIUM SERPL-MCNC: 9 MG/DL (ref 8.6–10.2)
CHLORIDE BLD-SCNC: 103 MMOL/L (ref 98–107)
CO2: 24 MMOL/L (ref 22–29)
CREAT SERPL-MCNC: 2 MG/DL (ref 0.7–1.2)
CULTURE SURGICAL: NORMAL
DESCRIPTION BLOOD BANK: NORMAL
FUNGUS STAIN: NORMAL
FUNGUS STAIN: NORMAL
GFR SERPL CREATININE-BSD FRML MDRD: 40 ML/MIN/1.73
GLUCOSE BLD-MCNC: 101 MG/DL (ref 74–99)
GRAM STAIN RESULT: NORMAL
HCT VFR BLD CALC: 32.3 % (ref 37–54)
HEMOGLOBIN: 10.4 G/DL (ref 12.5–16.5)
MCH RBC QN AUTO: 27.9 PG (ref 26–35)
MCHC RBC AUTO-ENTMCNC: 32.2 % (ref 32–34.5)
MCV RBC AUTO: 86.6 FL (ref 80–99.9)
PDW BLD-RTO: 12.9 FL (ref 11.5–15)
PLATELET # BLD: 377 E9/L (ref 130–450)
PMV BLD AUTO: 9.7 FL (ref 7–12)
POTASSIUM SERPL-SCNC: 4.6 MMOL/L (ref 3.5–5)
RBC # BLD: 3.73 E12/L (ref 3.8–5.8)
SODIUM BLD-SCNC: 139 MMOL/L (ref 132–146)
WBC # BLD: 8.2 E9/L (ref 4.5–11.5)

## 2023-01-13 PROCEDURE — 80048 BASIC METABOLIC PNL TOTAL CA: CPT

## 2023-01-13 PROCEDURE — 2140000000 HC CCU INTERMEDIATE R&B

## 2023-01-13 PROCEDURE — 6370000000 HC RX 637 (ALT 250 FOR IP): Performed by: PHYSICIAN ASSISTANT

## 2023-01-13 PROCEDURE — 71045 X-RAY EXAM CHEST 1 VIEW: CPT

## 2023-01-13 PROCEDURE — 6360000002 HC RX W HCPCS: Performed by: INTERNAL MEDICINE

## 2023-01-13 PROCEDURE — 94640 AIRWAY INHALATION TREATMENT: CPT

## 2023-01-13 PROCEDURE — 2580000003 HC RX 258: Performed by: PHYSICIAN ASSISTANT

## 2023-01-13 PROCEDURE — 36415 COLL VENOUS BLD VENIPUNCTURE: CPT

## 2023-01-13 PROCEDURE — 2580000003 HC RX 258: Performed by: INTERNAL MEDICINE

## 2023-01-13 PROCEDURE — 85027 COMPLETE CBC AUTOMATED: CPT

## 2023-01-13 PROCEDURE — 6370000000 HC RX 637 (ALT 250 FOR IP): Performed by: INTERNAL MEDICINE

## 2023-01-13 PROCEDURE — 6360000002 HC RX W HCPCS: Performed by: PHYSICIAN ASSISTANT

## 2023-01-13 RX ADMIN — IPRATROPIUM BROMIDE AND ALBUTEROL SULFATE 1 AMPULE: 2.5; .5 SOLUTION RESPIRATORY (INHALATION) at 09:47

## 2023-01-13 RX ADMIN — GUAIFENESIN 400 MG: 400 TABLET ORAL at 08:24

## 2023-01-13 RX ADMIN — ACETAMINOPHEN 650 MG: 325 TABLET ORAL at 12:21

## 2023-01-13 RX ADMIN — Medication 100 UNITS: at 08:24

## 2023-01-13 RX ADMIN — GUAIFENESIN 400 MG: 400 TABLET ORAL at 20:48

## 2023-01-13 RX ADMIN — IPRATROPIUM BROMIDE AND ALBUTEROL SULFATE 1 AMPULE: 2.5; .5 SOLUTION RESPIRATORY (INHALATION) at 20:38

## 2023-01-13 RX ADMIN — IPRATROPIUM BROMIDE AND ALBUTEROL SULFATE 1 AMPULE: 2.5; .5 SOLUTION RESPIRATORY (INHALATION) at 12:41

## 2023-01-13 RX ADMIN — MUPIROCIN: 20 OINTMENT TOPICAL at 08:24

## 2023-01-13 RX ADMIN — PIPERACILLIN AND TAZOBACTAM 3375 MG: 3; .375 INJECTION, POWDER, FOR SOLUTION INTRAVENOUS at 00:14

## 2023-01-13 RX ADMIN — ACETAMINOPHEN 650 MG: 325 TABLET ORAL at 06:40

## 2023-01-13 RX ADMIN — ENOXAPARIN SODIUM 30 MG: 100 INJECTION SUBCUTANEOUS at 08:23

## 2023-01-13 RX ADMIN — IPRATROPIUM BROMIDE AND ALBUTEROL SULFATE 1 AMPULE: 2.5; .5 SOLUTION RESPIRATORY (INHALATION) at 17:15

## 2023-01-13 RX ADMIN — SODIUM CHLORIDE, PRESERVATIVE FREE 10 ML: 5 INJECTION INTRAVENOUS at 20:48

## 2023-01-13 RX ADMIN — SENNOSIDES AND DOCUSATE SODIUM 1 TABLET: 50; 8.6 TABLET ORAL at 20:48

## 2023-01-13 RX ADMIN — QUETIAPINE FUMARATE 100 MG: 100 TABLET ORAL at 18:26

## 2023-01-13 RX ADMIN — ACETAMINOPHEN 650 MG: 325 TABLET ORAL at 00:14

## 2023-01-13 RX ADMIN — SENNOSIDES AND DOCUSATE SODIUM 1 TABLET: 50; 8.6 TABLET ORAL at 08:24

## 2023-01-13 RX ADMIN — PIPERACILLIN AND TAZOBACTAM 3375 MG: 3; .375 INJECTION, POWDER, FOR SOLUTION INTRAVENOUS at 15:37

## 2023-01-13 RX ADMIN — PIPERACILLIN AND TAZOBACTAM 3375 MG: 3; .375 INJECTION, POWDER, FOR SOLUTION INTRAVENOUS at 06:43

## 2023-01-13 RX ADMIN — PIPERACILLIN AND TAZOBACTAM 3375 MG: 3; .375 INJECTION, POWDER, FOR SOLUTION INTRAVENOUS at 23:04

## 2023-01-13 RX ADMIN — GUAIFENESIN 400 MG: 400 TABLET ORAL at 15:33

## 2023-01-13 RX ADMIN — BUPROPION HYDROCHLORIDE 300 MG: 300 TABLET, FILM COATED, EXTENDED RELEASE ORAL at 08:23

## 2023-01-13 RX ADMIN — ACETAMINOPHEN 650 MG: 325 TABLET ORAL at 18:26

## 2023-01-13 RX ADMIN — Medication 100 UNITS: at 20:49

## 2023-01-13 RX ADMIN — ACETAMINOPHEN 650 MG: 325 TABLET ORAL at 23:08

## 2023-01-13 RX ADMIN — SODIUM CHLORIDE, PRESERVATIVE FREE 10 ML: 5 INJECTION INTRAVENOUS at 08:24

## 2023-01-13 ASSESSMENT — PAIN SCALES - GENERAL
PAINLEVEL_OUTOF10: 3
PAINLEVEL_OUTOF10: 0
PAINLEVEL_OUTOF10: 0
PAINLEVEL_OUTOF10: 3
PAINLEVEL_OUTOF10: 2
PAINLEVEL_OUTOF10: 2
PAINLEVEL_OUTOF10: 0
PAINLEVEL_OUTOF10: 0
PAINLEVEL_OUTOF10: 2

## 2023-01-13 ASSESSMENT — PAIN DESCRIPTION - ORIENTATION
ORIENTATION: LEFT

## 2023-01-13 ASSESSMENT — PAIN DESCRIPTION - FREQUENCY: FREQUENCY: INTERMITTENT

## 2023-01-13 ASSESSMENT — PAIN DESCRIPTION - DESCRIPTORS
DESCRIPTORS: ACHING;DISCOMFORT;DULL
DESCRIPTORS: ACHING;DULL;DISCOMFORT
DESCRIPTORS: SORE;ACHING;DISCOMFORT

## 2023-01-13 ASSESSMENT — PAIN DESCRIPTION - ONSET: ONSET: GRADUAL

## 2023-01-13 ASSESSMENT — PAIN DESCRIPTION - PAIN TYPE: TYPE: SURGICAL PAIN

## 2023-01-13 ASSESSMENT — PAIN DESCRIPTION - LOCATION
LOCATION: INCISION;RIB CAGE
LOCATION: RIB CAGE
LOCATION: RIB CAGE

## 2023-01-13 ASSESSMENT — PAIN - FUNCTIONAL ASSESSMENT: PAIN_FUNCTIONAL_ASSESSMENT: ACTIVITIES ARE NOT PREVENTED

## 2023-01-13 NOTE — PROGRESS NOTES
Comprehensive Nutrition Assessment    Type and Reason for Visit:  Initial (LOS)    Nutrition Recommendations/Plan:   Recommend and start Ensure high protein supplement daily and [place] wound healing supplement BID to help meet increased nutritional needs from surgical wound healing. Malnutrition Assessment:  Malnutrition Status: At risk for malnutrition (Comment) (01/13/23 1206)    Context:  Acute Illness     Findings of the 6 clinical characteristics of malnutrition:  Energy Intake:  Mild decrease in energy intake (Comment) (since admission)  Weight Loss:  Unable to assess (d/t lack of actual weight history)     Body Fat Loss:  Unable to assess     Muscle Mass Loss:  Unable to assess    Fluid Accumulation:  No significant fluid accumulation     Strength:  Not Performed    Nutrition Assessment:    Patient adm w/ SOB and cough ; noted pleural effusion and empyema ; s/p thoracentesis on 1/8 ; s/p VATS on 1/10 ; noted NEGRO and PNA ; hx of bipolar ; will start ONS    Nutrition Related Findings:    -I&Os (-3.5 L), no edema, A&O x 4, fair appetite ; Wound Type: Surgical Incision (Incision x 1)       Current Nutrition Intake & Therapies:    Average Meal Intake: 51-75% (~50%)     ADULT DIET; Regular; Low Potassium (Less than 3000 mg/day)    Anthropometric Measures:  Height: 5' 9\" (175.3 cm)  Ideal Body Weight (IBW): 160 lbs (73 kg)    Admission Body Weight: 186 lb (84.4 kg) (1/8, first bedscale)  Current Body Weight: 186 lb (84.4 kg) (1/10, bedscale), 116.3 % IBW. Weight Source: Bed Scale  Current BMI (kg/m2): 27.5  Usual Body Weight:  (UTO ; EMR shows past weight of 190# no method on 6/11/18)                       BMI Categories: Overweight (BMI 25.0-29. 9)    Estimated Daily Nutrient Needs:  Energy Requirements Based On: Formula  Weight Used for Energy Requirements: Current  Energy (kcal/day): 2666-7813 (REE 1701 x 1.2 SF)  Weight Used for Protein Requirements: Ideal  Protein (g/day):  (1.2-1.4g/kg IBW)  Method Used for Fluid Requirements: 1 ml/kcal  Fluid (ml/day): 0875-3038    Nutrition Diagnosis:   Increased nutrient needs related to increase demand for energy/nutrients as evidenced by wounds (surgical)    Nutrition Interventions:   Food and/or Nutrient Delivery: Continue Current Diet, Start Oral Nutrition Supplement  Nutrition Education/Counseling: Education not indicated  Coordination of Nutrition Care: Continue to monitor while inpatient       Goals:  Previous Goal Met: Progressing toward Goal(s)  Goals: PO intake 75% or greater, by next RD assessment       Nutrition Monitoring and Evaluation:   Behavioral-Environmental Outcomes: None Identified  Food/Nutrient Intake Outcomes: Food and Nutrient Intake, Supplement Intake  Physical Signs/Symptoms Outcomes: Biochemical Data, Chewing or Swallowing, GI Status, Fluid Status or Edema, Meal Time Behavior, Hemodynamic Status, Nutrition Focused Physical Findings, Skin, Weight    Discharge Planning:     Too soon to determine     Estefania Baron RD, LD  Contact: [phone]

## 2023-01-13 NOTE — PROGRESS NOTES
MELVINA PROGRESS NOTE      Chief complaint: Follow-up of empyema     The patient is a 50 y.o. male with history of bipolar disorder, presented on 01/07 with pleuritic chest and back pain, seen by productive cough and congestion with low-grade fever for the past month. On admission, he was septic with fever of 101.4 °F, leukocytosis of 13,000, CT chest showing moderate to large sized left-sided pleural effusion causing significant compression atelectasis on the left lower lobe for which he underwent thoracentesis yielding 600 mL of pita/pink exudative pleural fluid with pH of 7.2. He underwent left VATS with decortication on 01/10 during which significant amount of fibropurulent exudate was noted. Pleural fluid gram stain and culture showed rare polymorphonuclear leukocytes, no epithelial cells, no organisms, no growth. Tissue gram stain and culture showed few polymorphonuclear leukocytes, no epithelial cells, no organisms, no growth. MRSA nares culture was negative. Piperacillin-tazobactam was started on admission. He received vancomycin from 01/08-0 1/09 and cefazolin perioperatively. Subjective: Patient was seen and examined. No chills, no abdominal pain, no diarrhea, no rash, no itching, no pleuritic chest paiin. Objective:    Vitals:    01/13/23 0746   BP: (!) 137/91   Pulse: 85   Resp: 18   Temp: 97.6 °F (36.4 °C)   SpO2: 95%     Constitutional: Alert, not in distress  Respiratory: Clear breath sounds, no crackles, no wheezes. Left chest tube in place  Cardiovascular: Regular rate and rhythm, no murmurs  Gastrointestinal: Bowel sounds present, soft, nontender  Skin: Warm and dry, no active dermatoses  Musculoskeletal: No joint swelling, no joint erythema    Labs, imaging, and medical records/notes were personally reviewed.     Assessment:  Empyema, left, s/p left VATS with decortication on 01/10    Recommendations:  Continue piperacillin-tazobactam 3.375 g every 8 hours for 3-4 weeks of IV antibiotic therapy from 01/10-02/07. Insert midline. Maintain midline care and monitor labs and schedule follow-up appointment per IV antibiotic protocol for OPAT as indicated on discharge instructions. Follow up with me at [address] on 02/02. Continue supportive care. Follow up discharge planning. Thank you for involving me in the care of Sim Ibarra. I will continue to follow. Please do not hesitate to call for any questions or concerns.     Electronically signed by Nhung Bhatti MD on 1/13/2023 at 8:56 AM

## 2023-01-13 NOTE — CARE COORDINATION
Met with pt and pt's fiance in room. Plan remains to home with [facility]. Pt signed service agreement for [facility] home health infusion. Faxed signed service agreement and iv zosyn script to [place]. [facility] is setup for iv zosyn . They can start services tomorrow 01/14 after pt receives his am dose of zosyn. [facility] requested to be notified on Saturday when his next dose of zosyn will be due at home. Rachel with [facility] said to call ext [phone]. Sw/cm will follow.

## 2023-01-13 NOTE — PROGRESS NOTES
Hospitalist Progress Note      Synopsis:   Briefly,Patient admitted on 2023 Mr. Melvin Torres, a 50y.o. year old male  who  has a past medical history of Bipolar 1 disorder ([facility] [address] presents with one month of intermittent cough and SOB, associated with left chest pain more upon coughing associated with low grade fever. Has sputum production but clear. Jettie Ales left chest pain, no relieving factors, denies nausea or vomiting no abdominal pain or diarrhea no lightheadedness or tingling. . Underwent thoracentesis which he tolerated well. Concern for complicated empyema. Thoracic surgery consulted. ECHO ordered and shows EF of 55%, indeterminate diastolic function. No obvious valvular abnormalities. Patient with repeat CT that shows moderate left pleural fluid collection with partially loculated appearance. Thoracic surgery consulted and patient underwent VATS on 1/10/2023. Patient also with NEGRO. Vancomycin discontinued. Continue Zosyn renally adjusted. Start IVF. Patient received contrast administration, Lasix, and vancomycin. Nephrology consulted. Nephrology stopped IVF. Creatinine downtrending. Cardiothoracic surgery signed off. PICC line placed on 2023, currently stable for discharge and awaiting home health care to evaluate for needs upon discharge. Hospital day 6     Subjective:  Stable overnight. No issues reported. Patient seen and examined  Records reviewed. Temp (24hrs), Av.3 °F (36.3 °C), Min:97 °F (36.1 °C), Max:97.7 °F (36.5 °C)    DIET: ADULT DIET;  Regular; Low Potassium (Less than 3000 mg/day)  CODE: Full Code    Intake/Output Summary (Last 24 hours) at 2023 1152  Last data filed at 2023 1039  Gross per 24 hour   Intake 1680 ml   Output 1300 ml   Net 380 ml         Objective:    BP (!) 137/91   Pulse 85   Temp 97.6 °F (36.4 °C) (Temporal)   Resp 18   Ht 5' 9\" (1.753 m)   Wt 186 lb 6.4 oz (84.6 kg)   SpO2 95%   BMI 27.53 kg/m²     General appearance: No apparent distress, appears stated age and cooperative. HEENT: Conjunctivae/corneas clear. Mucous membranes moist.  Neck: Supple. No JVD. Respiratory:  Clear to auscultation bilaterally. Normal respiratory effort. Cardiovascular:  RRR. S1, S2 without MRG. PV: Pulses palpable. No edema. Abdomen: Soft, non-tender, non-distended. +BS  Musculoskeletal: No obvious deformities. Skin: Normal skin color. No rashes or lesions. Good turgor. Neurologic:  Grossly non-focal. Awake, alert, following commands.    Psychiatric: Alert and oriented, thought content appropriate, normal insight and judgement    Medications:  REVIEWED DAILY    Infusion Medications    sodium chloride      sodium chloride       Scheduled Medications    guaiFENesin  400 mg Oral TID    piperacillin-tazobactam  3,375 mg IntraVENous Q8H    lidocaine  5 mL IntraDERmal Once    sodium chloride flush  5-40 mL IntraVENous 2 times per day    heparin flush  1 mL IntraVENous 2 times per day    sodium chloride flush  5-40 mL IntraVENous 2 times per day    enoxaparin  30 mg SubCUTAneous Daily    acetaminophen  650 mg Oral Q6H    sennosides-docusate sodium  1 tablet Oral BID    ipratropium-albuterol  1 ampule Inhalation Q4H WA    buPROPion  300 mg Oral Daily    QUEtiapine  100 mg Oral QPM     PRN Meds: magnesium hydroxide, sodium chloride flush, sodium chloride, heparin flush, sodium chloride flush, sodium chloride, ondansetron, bisacodyl, morphine, sodium chloride, trimethobenzamide, oxyCODONE-acetaminophen, medicated lip balm    Labs:     Recent Labs     01/11/23  0616 01/12/23  0614 01/13/23  0630   WBC 10.6 9.0 8.2   HGB 11.6* 11.5* 10.4*   HCT 36.2* 35.5* 32.3*    392 377       Recent Labs     01/11/23  0616 01/12/23  0614 01/13/23  0630    144 139   K 4.9 4.2 4.6    106 103   CO2 17* 23 24   BUN 32* 28* 22*   CREATININE 3.0* 2.3* 2.0*   CALCIUM 9.1 9.5 9.0       Recent Labs     01/11/23  0616   PROT 7.2   ALKPHOS 101   ALT 15   AST 16   BILITOT 0.6       No results for input(s): INR in the last 72 hours. No results for input(s): Blake Garrison in the last 72 hours. Chronic labs:    Lab Results   Component Value Date    INR 1.3 01/09/2023       Radiology: REVIEWED DAILY    Assessment:  Empyema  Acute kidney injury  Depression  Plan:  Status postthoracentesis 1/8/2023  Reviewed echo revealed EF 55%  VATS procedure on 1/10/2023  Nephrology consulted for NEGRO  Infectious disease consulted  Pulmonology to sign off, okay with discharge and arrange for follow-up outpatient in 2 to 3 weeks        DVT Prophylaxis [] Lovenox  []  Heparin [] DOAC [] PCDs [x] Ambulation    GI Prophylaxis [] PPI  [] H2 Blocker   [] Carafate  [x] Diet/Tube Feeds   Level of care [] Med/Surg  [x] Intermediate  []  ICU   Diet ADULT DIET; Regular; Low Potassium (Less than 3000 mg/day)    Family contact [x]  N/A    [] At bedside  [] Phone call   Disposition Patient requires continued admission likely can discharge tomorrow with home health care orders   MDM [] Low    [x] Moderate  []   High       Discharge Plan: Home with home health care    +++++++++++++++++++++++++++++++++++++++++++++++++  SANTOS Connelly/ [address], [address]  +++++++++++++++++++++++++++++++++++++++++++++++++  NOTE: This report was transcribed using voice recognition software. Every effort was made to ensure accuracy; however, inadvertent computerized transcription errors may be present.

## 2023-01-13 NOTE — PROGRESS NOTES
Merissa Danielle M.D.,[facility]  Lana Blanco D.O., F.A.C.O.I., Kyara Sahu M.D. Lois Obrien M.D. Abdiel Alexander D.O. Daily Pulmonary Progress Note    Patient:  Francesca Becerra 50 y.o. male MRN: [id number]     Date of Service: 1/13/2023      Synopsis     We are following patient for pleural effusion    \"CC\" shortness of breath    Code status: Full      Subjective      Patient was seen and examined. Feeling much improved tolerated left VATS 1/10/23. Minimal discomfort. Increasing activity. Chest tube removal yesterday per surgery team today. Renal function improving, creat down to 2.0. ID input noted, anticipate 2-3 wks abx with Zosyn. Likely DC tomorrow when home health care can be set up. Review of Systems:  Constitutional: Denies fever, weight loss, night sweats, and fatigue  Skin: Denies pigmentation, dark lesions, and rashes   HEENT: Denies hearing loss, tinnitus, ear drainage, epistaxis, sore throat, and hoarseness. Cardiovascular: Denies palpitations, chest pain, and chest pressure. Respiratory:  left sided chest discomfort at incision site.   Left VATS  Gastrointestinal: Denies nausea, vomiting, poor appetite, diarrhea, heartburn or reflux  Genitourinary: Denies dysuria, frequency, urgency or hematuria  Musculoskeletal: Denies myalgias, muscle weakness, and bone pain  Neurological: Denies dizziness, vertigo, headache, and focal weakness  Psychological: Denies anxiety and depression  Endocrine: Denies heat intolerance and cold intolerance  Hematopoietic/Lymphatic: Denies bleeding problems and blood transfusions    24-hour events:  None     Objective   Vitals: BP (!) 137/91   Pulse 85   Temp 97.6 °F (36.4 °C) (Temporal)   Resp 18   Ht 5' 9\" (1.753 m)   Wt 186 lb 6.4 oz (84.6 kg)   SpO2 95%   BMI 27.53 kg/m²     I/O:    Intake/Output Summary (Last 24 hours) at 1/13/2023 0933  Last data filed at 1/13/2023 0606  Gross per 24 hour   Intake 1680 ml   Output 1300 ml   Net 380 ml CURRENT MEDS :  Scheduled Meds:   guaiFENesin  400 mg Oral TID    piperacillin-tazobactam  3,375 mg IntraVENous Q8H    lidocaine  5 mL IntraDERmal Once    sodium chloride flush  5-40 mL IntraVENous 2 times per day    heparin flush  1 mL IntraVENous 2 times per day    sodium chloride flush  5-40 mL IntraVENous 2 times per day    enoxaparin  30 mg SubCUTAneous Daily    acetaminophen  650 mg Oral Q6H    sennosides-docusate sodium  1 tablet Oral BID    ipratropium-albuterol  1 ampule Inhalation Q4H WA    buPROPion  300 mg Oral Daily    QUEtiapine  100 mg Oral QPM       Physical Exam:  General Appearance: appears comfortable in no acute distress. HEENT: Normocephalic atraumatic without obvious abnormality   Neck: Lips, mucosa, and tongue normal.  Supple, symmetrical, trachea midline, no adenopathy;thyroid:  no enlargement/tenderness/nodules or JVD. Lung: Breath sounds slightly diminished left base respirations   unlabored. Symmetrical expansion. Heart: RRR, normal S1, S2. No MRG  Abdomen: Soft, NT, ND. BS present x 4 quadrants. No bruit or organomegaly. Extremities: Pedal pulses 2+ symmetric b/l. Extremities normal, no cyanosis, clubbing, or edema. Musculokeletal: No joint swelling, no muscle tenderness. ROM normal in all joints of extremities. Neurologic: Mental status: Alert and Oriented X3 . Pertinent/ New Labs and Imaging Studies     Imaging Personally Reviewed:  Postop chest x-ray  FINDINGS:   Stable left-sided chest tubes. Unchanged atelectasis and or infiltrate at   the left base. Trace left apical pneumothorax. Impression   Trace left apical pneumothorax. Persistent atelectasis and or infiltrate at   the left base.                Ct chest non contrast 1/9/23  Moderate left pleural fluid collection with a partially loculated appearance   limited for empyema evaluation due to lack of intravenous contrast without   gross pleural wall thickening evident similar to prior comparison from   01/07/2023 with adjacent atelectasis. No new or separate consolidation. 1/9/2023 chest x-ray  FINDINGS:   Cardiac size remains enlarged with opacifications at the left lung base of   moderate left pleural effusion and adjacent atelectasis. No pneumothorax. Degenerate changes of the spine. Impression   Stable moderate volume left pleural effusion with adjacent atelectasis                 CTA chest on 1/7/23  Impression   1. No indication for acute pulmonary emboli. 2.  No aneurysm formation or dissection of the thoracic aorta. 3.  Moderate to large size left-sided pleural effusion causes significant   compression atelectasis of the left lower lobe with patent central airways. RECOMMENDATIONS:   Unavailable         ECHO  1/9/2023-   Summary   Technically difficult study - limited visualization. Micro-bubble contrast injected to enhance left ventricular visualization. Normal left ventricular size and systolic function. Ejection fraction is visually estimated at 55%. Indeterminate diastolic function. No regional wall motion abnormalities seen. Mild left ventricular concentric hypertrophy noted. Grossly normal right ventricular size and function. Valves not well seen; no obvious abnormalities by Doppler interrogation.     Labs:  Lab Results   Component Value Date/Time    WBC 8.2 01/13/2023 06:30 AM    HGB 10.4 01/13/2023 06:30 AM    HCT 32.3 01/13/2023 06:30 AM    MCV 86.6 01/13/2023 06:30 AM    MCH 27.9 01/13/2023 06:30 AM    MCHC 32.2 01/13/2023 06:30 AM    RDW 12.9 01/13/2023 06:30 AM     01/13/2023 06:30 AM    MPV 9.7 01/13/2023 06:30 AM     Lab Results   Component Value Date/Time     01/13/2023 06:30 AM    K 4.6 01/13/2023 06:30 AM    K 4.2 01/09/2023 09:05 PM     01/13/2023 06:30 AM    CO2 24 01/13/2023 06:30 AM    BUN 22 01/13/2023 06:30 AM    CREATININE 2.0 01/13/2023 06:30 AM    LABALBU 2.5 01/11/2023 06:16 AM    CALCIUM 9.0 01/13/2023 06:30 AM    LABGLOM 40 01/13/2023 06:30 AM     Lab Results   Component Value Date/Time    PROTIME 14.6 01/09/2023 07:14 PM    INR 1.3 01/09/2023 07:14 PM     No results for input(s): PROBNP in the last 72 hours. No results for input(s): PROCAL in the last 72 hours. This SmartLink has not been configured with any valid records. Micro:  No results for input(s): CULTRESP in the last 72 hours. Recent Labs     01/10/23  1301   LABGRAM Refer to ordered Gram stain for results       No results for input(s): LEGUR in the last 72 hours. No results for input(s): STREPNEUMAGU in the last 72 hours. No results for input(s): LP1UAG in the last 72 hours.     Pleural fluid studies consistent with exudate    Latest Reference Range & Units 1/8/23 08:30 1/8/23 09:11   Source + CELL CT/DIFF-BF  Pleural    Appearance, Fluid  Cloudy    Color, Fluid  Pink    Fluid Type  Pleural    Glucose, Fluid Not Established mg/dL 91    LD, Fluid Not Established U/L 448    RBC, Fluid /uL 17,000    Monocyte Count, Fluid % 41 (C)    Neutrophil Count, Fluid % 59    Nucl Cell, Fluid /uL 7,014 (C)    pH, Fluid   7.266   Protein, Fluid Not Established g/dL 5.4    (C): Corrected  Pleural fluid culture 1/8/2023 thoracentesis  Body Fluid Culture, Sterile Growth not present      Component 1/8/23 0830    Gram Stain Orderable Gram stain performed on unspun fluid   Rare Polymorphonuclear leukocytes   Epithelial cells not seen   No organisms seen    1/8 cytology pleural fluid NEGATIVE    1/10/23 surgical cultures  Value: Growth not present, incubation continues   24 Hours growth not present; incubation continues       Orderable Gram stain performed from tissue touch prep   Few Polymorphonuclear leukocytes   Epithelial cells not seen   No organisms seen      Assessment:    Left lung empyema with compressive atelectasis  L VATS with decortication on 1/10/23  Recent URI 1 month ago, recent dental procedure  Left thoracentesis 1/8/2023 fluid consistent with exudate, possible empyema vs parapneumonic effusion  Community-acquired pneumonia  Fevers  Left-sided pleuritic chest pain  Bipolar 1 disorder  Volume contraction, mild NEGRO     Plan:   Zosyn for empyema. Per ID recommendations 3-4 wks abx recommended 1/10-2/07/23. Midline today prior to dc  CT surgery for mgmt of chest tube. Await final surgical cultures 1/10/23, no AFB observed, surgical culture with gram stain growth not present, fungal culture pending. 1/8/23 fluid cytology from thoracentesis negative. Monitor off oxygen keep SPO2 greater than 92%  Off fluids, monitor renal function-improving. Creat 2.3  2D echo completed 1/9/23  Analgesics as needed   DVT, GI prophylaxis  Ok to dc from a pulmonary perspective when ok with all others and home care arranged for antibiotics. Pulmonary to sign off at this time please call again if needed. Follow up with repeat imaging as OP 2-3 wks , message routed  This plan of care was reviewed in collaboration with Dr. Sin Ramirez   Electronically signed by LYN Ingram - CNP on 1/13/2023 at 9:33 AM      I personally saw, examined, and cared for the patient. Labs, medications, radiographs reviewed. I agree with history exam and plans detailed in NP note. Will repeat CT chest in about 2-3 weeks with follow-up.   Will be on IV antibiotics per ID for about 4 weeks    Chris Israel,

## 2023-01-13 NOTE — PATIENT CARE CONFERENCE
[facility] Quality Flow/Interdisciplinary Rounds Progress Note        Quality Flow Rounds held on January 13, 2023    Disciplines Attending:  Bedside Nurse, , , and Nursing Unit Leadership    Glenis Calles was admitted on 1/7/2023 10:08 AM    Anticipated Discharge Date:  Expected Discharge Date: 01/13/23    Disposition:    Alonzo Score:  Alonzo Scale Score: 20    Readmission Risk              Risk of Unplanned Readmission:  17           Discussed patient goal for the day, patient clinical progression, and barriers to discharge.   The following Goal(s) of the Day/Commitment(s) have been identified:  Labs - Report Results      Lani Messer RN  January 13, 2023

## 2023-01-13 NOTE — PLAN OF CARE
Problem: Pain  Goal: Verbalizes/displays adequate comfort level or baseline comfort level  1/13/2023 0205 by Jim Neri RN  Outcome: Progressing     Problem: Discharge Planning  Goal: Discharge to home or other facility with appropriate resources  1/13/2023 0205 by Jim Neri RN  Outcome: Progressing

## 2023-01-13 NOTE — HOME CARE
PLEASE ADD HOMECARE ORDERS TO Epic FOR PATIENTS SOC UPON DISCHARGE.      Mo Sahu LPN   [facility]

## 2023-01-13 NOTE — PROGRESS NOTES
[facility] MD Maura Block, MD Pham Silver, CNP   Lilly Zhu, ANP  Shayne Hollins, NERY  Progress Note    1/13/2023    SUBJECTIVE:   1/12: Sitting up in bed. No acute distress. Chest tube was removed earlier today. Denies chest pain, dyspnea, or palpitations. Appetite is good. On room air. 1/13: Sitting up in bed. Family is present at bedside. No acute complaints. ROS is unremarkable. Plan is for discharge tomorrow. PROBLEM LIST:    Principal Problem:    Pleural effusion on left  Active Problems:    Pleural effusion    Empyema (HCC)  Resolved Problems:    * No resolved hospital problems. *         DIET:    ADULT DIET; Regular; Low Potassium (Less than 3000 mg/day)  ADULT ORAL NUTRITION SUPPLEMENT; Lunch; Low Calorie/High Protein Oral Supplement  ADULT ORAL NUTRITION SUPPLEMENT; Breakfast, Dinner;  Wound Healing Oral Supplement     MEDS (scheduled):    guaiFENesin  400 mg Oral TID    piperacillin-tazobactam  3,375 mg IntraVENous Q8H    lidocaine  5 mL IntraDERmal Once    sodium chloride flush  5-40 mL IntraVENous 2 times per day    heparin flush  1 mL IntraVENous 2 times per day    sodium chloride flush  5-40 mL IntraVENous 2 times per day    enoxaparin  30 mg SubCUTAneous Daily    acetaminophen  650 mg Oral Q6H    sennosides-docusate sodium  1 tablet Oral BID    ipratropium-albuterol  1 ampule Inhalation Q4H WA    buPROPion  300 mg Oral Daily    QUEtiapine  100 mg Oral QPM       MEDS (infusions):   sodium chloride      sodium chloride         MEDS (prn):  magnesium hydroxide, sodium chloride flush, sodium chloride, heparin flush, sodium chloride flush, sodium chloride, ondansetron, bisacodyl, morphine, sodium chloride, trimethobenzamide, oxyCODONE-acetaminophen, medicated lip balm    PHYSICAL EXAM:     Patient Vitals for the past 24 hrs:   BP Temp Temp src Pulse Resp SpO2 Height   01/13/23 1153 (!) 150/86 96.9 °F (36.1 °C) Temporal 93 18 98 % --   01/13/23 1136 -- -- -- -- -- -- 5' 9\" (1.753 m)   01/13/23 0746 (!) 137/91 97.6 °F (36.4 °C) Temporal 85 18 95 % --   01/13/23 0229 121/82 97.1 °F (36.2 °C) Temporal 73 16 94 % --   01/12/23 2310 139/83 97.1 °F (36.2 °C) Temporal 85 18 97 % --   01/12/23 1949 126/87 97 °F (36.1 °C) Temporal 89 18 94 % --   01/12/23 1658 (!) 154/97 97 °F (36.1 °C) Temporal 93 18 96 % --     @      Intake/Output Summary (Last 24 hours) at 1/13/2023 1531  Last data filed at 1/13/2023 1453  Gross per 24 hour   Intake 600 ml   Output 1800 ml   Net -1200 ml           Wt Readings from Last 3 Encounters:   01/10/23 186 lb 6.4 oz (84.6 kg)   06/11/18 190 lb (86.2 kg)       Constitutional:  in no acute distress  HEENT: NC/AT, EOMI, sclera and conjunctiva are clear and anicteric, mucus membranes moist  Neck: Trachea midline, no JVD  Cardiovascular: S1, S2 regular rhythm, no murmur,or rub  Respiratory:  CTAB, diminished in left lung base. No crackles, no wheeze  Gastrointestinal:  Soft, nontender, nondistended, NABS  Ext: no edema, feet warm  Skin: dry, no rash  Neuro: awake, alert, interactive      DATA:    Recent Labs     01/11/23  0616 01/12/23  0614 01/13/23  0630   WBC 10.6 9.0 8.2   HGB 11.6* 11.5* 10.4*   HCT 36.2* 35.5* 32.3*   MCV 88.7 87.0 86.6    392 377       Recent Labs     01/10/23  1644 01/11/23  0616 01/12/23  0614 01/13/23  0630    134 144 139   K 5.0 4.9 4.2 4.6    101 106 103   CO2 16* 17* 23 24   MG 2.6  --   --   --    BUN 29* 32* 28* 22*   CREATININE 3.2* 3.0* 2.3* 2.0*   ALT  --  15  --   --    AST  --  16  --   --    BILIDIR  --  0.3  --   --    BILITOT  --  0.6  --   --    ALKPHOS  --  101  --   --          Lab Results   Component Value Date    LABPROT 0.3 (H) 01/11/2023    LABPROT 0.3 01/11/2023       ASSESSMENT :  Acute kidney injury in the setting of volume contraction secondary to contrast induced nephropathy, decreased oral intake, and diarrhea. Creatinine on presentation was 0.8 mg/dL. Increased to 1.7 mg/dL after receiving intravenous contrast and then went to 3.5 mg/dL. He was also receiving PO lasix which has been held. Metabolic acidosis due to acute kidney injury      Renal ultrasound- unremarkable   The trajectory of azotemia is consistent with contrast inducted nephropathy .    He is euvolemic  Cr already improving     Cr 3.0-->2.3-->2.0 mg/dL     Plan:  Renal function continues to improve   Support hemodynamic   Encourage po intake   Cr already improving  Avoid nephrotoxins      Electronically signed by LYN Corado CNP on 1/13/2023 at 3:31 PM

## 2023-01-14 ENCOUNTER — APPOINTMENT (OUTPATIENT)
Dept: GENERAL RADIOLOGY | Age: 48
End: 2023-01-14
Payer: COMMERCIAL

## 2023-01-14 VITALS
OXYGEN SATURATION: 100 % | DIASTOLIC BLOOD PRESSURE: 99 MMHG | RESPIRATION RATE: 14 BRPM | WEIGHT: 186.4 LBS | BODY MASS INDEX: 27.61 KG/M2 | HEART RATE: 100 BPM | TEMPERATURE: 97.5 F | SYSTOLIC BLOOD PRESSURE: 137 MMHG | HEIGHT: 69 IN

## 2023-01-14 LAB
ANION GAP SERPL CALCULATED.3IONS-SCNC: 11 MMOL/L (ref 7–16)
BUN BLDV-MCNC: 21 MG/DL (ref 6–20)
CALCIUM SERPL-MCNC: 9 MG/DL (ref 8.6–10.2)
CHLORIDE BLD-SCNC: 104 MMOL/L (ref 98–107)
CO2: 27 MMOL/L (ref 22–29)
CREAT SERPL-MCNC: 1.8 MG/DL (ref 0.7–1.2)
GFR SERPL CREATININE-BSD FRML MDRD: 46 ML/MIN/1.73
GLUCOSE BLD-MCNC: 106 MG/DL (ref 74–99)
POTASSIUM SERPL-SCNC: 4.3 MMOL/L (ref 3.5–5)
SODIUM BLD-SCNC: 142 MMOL/L (ref 132–146)

## 2023-01-14 PROCEDURE — 6360000002 HC RX W HCPCS: Performed by: PHYSICIAN ASSISTANT

## 2023-01-14 PROCEDURE — 94669 MECHANICAL CHEST WALL OSCILL: CPT

## 2023-01-14 PROCEDURE — 2580000003 HC RX 258: Performed by: PHYSICIAN ASSISTANT

## 2023-01-14 PROCEDURE — 36592 COLLECT BLOOD FROM PICC: CPT

## 2023-01-14 PROCEDURE — 6370000000 HC RX 637 (ALT 250 FOR IP): Performed by: PHYSICIAN ASSISTANT

## 2023-01-14 PROCEDURE — 2580000003 HC RX 258: Performed by: INTERNAL MEDICINE

## 2023-01-14 PROCEDURE — 94640 AIRWAY INHALATION TREATMENT: CPT

## 2023-01-14 PROCEDURE — 36415 COLL VENOUS BLD VENIPUNCTURE: CPT

## 2023-01-14 PROCEDURE — 80048 BASIC METABOLIC PNL TOTAL CA: CPT

## 2023-01-14 PROCEDURE — 71045 X-RAY EXAM CHEST 1 VIEW: CPT

## 2023-01-14 PROCEDURE — 6370000000 HC RX 637 (ALT 250 FOR IP): Performed by: INTERNAL MEDICINE

## 2023-01-14 PROCEDURE — 6360000002 HC RX W HCPCS: Performed by: INTERNAL MEDICINE

## 2023-01-14 RX ORDER — GUAIFENESIN 400 MG/1
400 TABLET ORAL 3 TIMES DAILY
Qty: 56 TABLET | Refills: 0 | Status: SHIPPED | OUTPATIENT
Start: 2023-01-14

## 2023-01-14 RX ADMIN — ENOXAPARIN SODIUM 30 MG: 100 INJECTION SUBCUTANEOUS at 08:05

## 2023-01-14 RX ADMIN — BUPROPION HYDROCHLORIDE 300 MG: 300 TABLET, FILM COATED, EXTENDED RELEASE ORAL at 08:05

## 2023-01-14 RX ADMIN — Medication 100 UNITS: at 08:05

## 2023-01-14 RX ADMIN — SENNOSIDES AND DOCUSATE SODIUM 1 TABLET: 50; 8.6 TABLET ORAL at 08:05

## 2023-01-14 RX ADMIN — PIPERACILLIN AND TAZOBACTAM 3375 MG: 3; .375 INJECTION, POWDER, FOR SOLUTION INTRAVENOUS at 06:34

## 2023-01-14 RX ADMIN — ACETAMINOPHEN 650 MG: 325 TABLET ORAL at 06:35

## 2023-01-14 RX ADMIN — SODIUM CHLORIDE, PRESERVATIVE FREE 10 ML: 5 INJECTION INTRAVENOUS at 08:05

## 2023-01-14 RX ADMIN — GUAIFENESIN 400 MG: 400 TABLET ORAL at 08:05

## 2023-01-14 RX ADMIN — IPRATROPIUM BROMIDE AND ALBUTEROL SULFATE 1 AMPULE: 2.5; .5 SOLUTION RESPIRATORY (INHALATION) at 08:00

## 2023-01-14 ASSESSMENT — PAIN SCALES - GENERAL
PAINLEVEL_OUTOF10: 0
PAINLEVEL_OUTOF10: 2

## 2023-01-14 NOTE — PLAN OF CARE
Problem: Pain  Goal: Verbalizes/displays adequate comfort level or baseline comfort level  Outcome: Progressing     Problem: Discharge Planning  Goal: Discharge to home or other facility with appropriate resources  Outcome: Progressing     Problem: ABCDS Injury Assessment  Goal: Absence of physical injury  Outcome: Progressing     Problem: Nutrition Deficit:  Goal: Optimize nutritional status  Outcome: Progressing

## 2023-01-14 NOTE — DISCHARGE SUMMARY
Hospitalist Discharge Summary    Patient ID: Neto Sánchez   Patient : 1975  Patient's PCP: Tanmay Pan MD    Admit Date: 2023   Admitting Physician: Doron Abraham DO    Discharge Date:  2023   Discharge Physician: LYN Adams - CNP   Discharge Condition: Stable  Discharge Disposition: Home with [facility] course in brief:  (Please refer to daily progress notes for a comprehensive review of the hospitalization by requesting medical records)  Briefly,Patient admitted on 2023 Mr. Neto Sánchez, a 50y.o. year old male  who  has a past medical history of Bipolar 1 disorder ([facility] [address] presents with one month of intermittent cough and SOB, associated with left chest pain more upon coughing associated with low grade fever. Has sputum production but clear. Kee Close left chest pain, no relieving factors, denies nausea or vomiting no abdominal pain or diarrhea no lightheadedness or tingling. . Underwent thoracentesis which he tolerated well. Concern for complicated empyema. Thoracic surgery consulted. ECHO ordered and shows EF of 55%, indeterminate diastolic function. No obvious valvular abnormalities. Patient with repeat CT that shows moderate left pleural fluid collection with partially loculated appearance. Thoracic surgery consulted and patient underwent VATS on 1/10/2023. Patient also with NEGRO. Vancomycin discontinued. Continue Zosyn renally adjusted. Start IVF. Patient received contrast administration, Lasix, and vancomycin. Nephrology consulted. Nephrology stopped IVF. Creatinine downtrending. Cardiothoracic surgery signed off. PICC line placed on 2023, currently stable for discharge and awaiting home health care to evaluate for needs upon discharge. Home health care saw patient, patient stable for discharge from medical perspective with home health care for assistance with IV antibiotics.       Consults:   IP CONSULT TO PULMONOLOGY  IP CONSULT TO CARDIOTHORACIC SURGERY  IP CONSULT TO NEPHROLOGY  IP CONSULT TO PULMONOLOGY  IP CONSULT TO SOCIAL WORK  IP CONSULT TO INFECTIOUS DISEASES  TEST MOCK CON71    Discharge Diagnoses:  Empyema  Acute kidney injury  Depression      Discharge Instructions / Follow up:    Future Appointments   Date Time Provider Louis Fraser   1/24/2023 12:45 PM Whitney Guan DO CARDIO SURG [facility]       The patient's condition is stable. At this time the patient is without objective evidence of an acute process requiring continuing hospitalization or inpatient management. They are stable for discharge with outpatient follow-up. I have spoken with the patient and discussed the results of the current hospitalization, in addition to providing specific details for the plan of care and counseling regarding the diagnosis and prognosis. The plan has been discussed in detail and they are aware of the specific conditions for emergent return, as well as the importance of follow-up. Their questions are answered at this time and they are agreeable with the plan for discharge to home. Continued appropriate risk factor modification of blood pressure, diabetes and serum lipids will remain essential to reducing risk of future atherosclerotic development    Activity: activity as tolerated    Physical exam:  General appearance: No apparent distress, appears stated age and cooperative. HEENT: Normal cephalic, atraumatic without obvious deformity. Pupils equal, round, and reactive to light. Extra ocular muscles intact. Conjunctivae/corneas clear. Neck: Supple, with full range of motion. No jugular venous distention. Trachea midline. Respiratory:  Clear to auscultation bilaterally. No apparent distress. Cardiovascular:  Regular rate and rhythm. S1, S2 without murmurs, rubs, or gallops. PV: Brisk capillary refill. +2 pedal and radial pulses bilaterally. No clubbing, cyanosis, edema of bilateral lower extremities.    Abdomen: Soft, non-tender, non-distended. +BS  Musculoskeletal: No obvious deformities or erythematous or edematous joints. Skin: Normal skin color. No rashes or lesions. Neurologic:  Neurovascularly intact without any focal sensory/motor deficits. Cranial nerves: II-XII intact, grossly non-focal.  Psychiatric: Alert and oriented, thought content appropriate, normal insight    Significant labs:  CBC:   Recent Labs     01/12/23  0614 01/13/23  0630   WBC 9.0 8.2   RBC 4.08 3.73*   HGB 11.5* 10.4*   HCT 35.5* 32.3*   MCV 87.0 86.6   RDW 13.2 12.9    377     BMP:   Recent Labs     01/12/23  0614 01/13/23  0630 01/14/23  0600    139 142   K 4.2 4.6 4.3    103 104   CO2 23 24 27   BUN 28* 22* 21*   CREATININE 2.3* 2.0* 1.8*     LFT:  No results for input(s): PROT, ALB, ALKPHOS, ALT, AST, BILITOT, AMYLASE, LIPASE in the last 72 hours. PT/INR: No results for input(s): INR, APTT in the last 72 hours. BNP: No results for input(s): BNP in the last 72 hours. Hgb A1C: No results found for: LABA1C  Folate and B12: No results found for: IOSWBDXC79, No results found for: FOLATE  Thyroid Studies: No results found for: TSH, A2JCLNX, B7XROLL, THYROIDAB    Urinalysis:  No results found for: NITRU, WBCUA, BACTERIA, RBCUA, BLOODU, SPECGRAV, GLUCOSEU    Imaging:  CT CHEST WO CONTRAST    Result Date: 1/9/2023  EXAMINATION: CT OF THE CHEST WITHOUT CONTRAST 1/9/2023 4:01 pm TECHNIQUE: CT of the chest was performed without the administration of intravenous contrast. Multiplanar reformatted images are provided for review. Automated exposure control, iterative reconstruction, and/or weight based adjustment of the mA/kV was utilized to reduce the radiation dose to as low as reasonably achievable.  COMPARISON: CTA chest 01/07/2023, chest x-ray 8 hours prior 01/09/2023 HISTORY: ORDERING SYSTEM PROVIDED HISTORY: empyema vs parapneumonic effusion TECHNOLOGIST PROVIDED HISTORY: Reason for exam:->empyema vs parapneumonic effusion What reading provider will be dictating this exam?->CRC FINDINGS: Mediastinum: Thyroid is homogeneous in attenuation. No bulky mediastinal adenopathy. Central airways are patent. Esophagus is normal course and caliber to the distal segment where there is a small hiatal hernia. .  Cardiac size within normal limits without pericardial effusion. Lungs/pleura: Lungs are clear without focal opacification or consolidation. No suspicious or dominant pulmonary nodule. Moderate left pleural fluid collection with a partially loculated appearance limited for empyema evaluation due to lack of intravenous contrast without gross pleural wall thickening evident similar to prior comparison from 01/07/2023 with adjacent atelectasis. Upper Abdomen: Visualized portions of the upper abdomen unremarkable. Soft Tissues/Bones: No acute osseous or soft tissue findings. No aggressive osseous lesion. Moderate left pleural fluid collection with a partially loculated appearance limited for empyema evaluation due to lack of intravenous contrast without gross pleural wall thickening evident similar to prior comparison from 01/07/2023 with adjacent atelectasis. No new or separate consolidation. XR CHEST PORTABLE    Result Date: 1/13/2023  EXAMINATION: ONE XRAY VIEW OF THE CHEST 1/13/2023 7:51 am COMPARISON: Comparison studies of a January 9 through January 12, reviewed the CT chest January 7. HISTORY: ORDERING SYSTEM PROVIDED HISTORY: s/p vats, evaluate lung fields and tube(s) TECHNOLOGIST PROVIDED HISTORY: Reason for exam:->s/p vats, evaluate lung fields and tube(s) What reading provider will be dictating this exam?->CRC FINDINGS: There is loss of volume of the left lung base with increased density and some elevation of the left diaphragma. Patient had the previous left-sided pleural effusion, chest tube had been placed but presently removed. There residual areas atelectasis and the incomplete re-expansion of the left lung base.  There is still a component of pleural effusion present. Quantification of left-sided pleural effusion can be achieved with right and left lateral decubitus views of the chest or with bedside ultrasound. Heart is normal size, mediastinum appears unremarkable. Right lungs well expanded. Right-sided pleural space are free. There is no pneumothorax on the right or on the.     1.  Incomplete re-expansion of the left lower lung base with loss of volume and areas of multi subsegmental atelectasis. 2.  Diffuse increased density in the left lower lung base can represent a small residual pleural effusion. 3.  No pneumothorax on the or on the left. XR CHEST PORTABLE    Result Date: 1/12/2023  EXAMINATION: ONE XRAY VIEW OF THE CHEST 1/12/2023 11:50 am COMPARISON: /11/23 HISTORY: ORDERING SYSTEM PROVIDED HISTORY: s/p vats, evaluate lung fields and tube(s) TECHNOLOGIST PROVIDED HISTORY: Reason for exam:->s/p vats, evaluate lung fields and tube(s) What reading provider will be dictating this exam?->CRC FINDINGS: Residual small left pleural effusion. There is no effusion or pneumothorax. The cardiomediastinal silhouette is without acute process. The osseous structures are without acute process. The left chest tube has been removed. Subcutaneous air along the left lower chest wall. No sizable pneumothorax. Residual probable small left pleural effusion. XR CHEST PORTABLE    Result Date: 1/11/2023  EXAMINATION: ONE XRAY VIEW OF THE CHEST 1/11/2023 7:24 am COMPARISON: 10 January 2023 HISTORY: ORDERING SYSTEM PROVIDED HISTORY: s/p vats, evaluate lung fields and tube(s) TECHNOLOGIST PROVIDED HISTORY: Reason for exam:->s/p vats, evaluate lung fields and tube(s) What reading provider will be dictating this exam?->CRC FINDINGS: Stable left-sided chest tubes. Unchanged atelectasis and or infiltrate at the left base. Trace left apical pneumothorax. Trace left apical pneumothorax.   Persistent atelectasis and or infiltrate at the left base. XR CHEST PORTABLE    Result Date: 1/10/2023  EXAMINATION: ONE XRAY VIEW OF THE CHEST 1/10/2023 3:21 pm COMPARISON: 01/09/2023. HISTORY: ORDERING SYSTEM PROVIDED HISTORY: evaluate lung fields, tube placement TECHNOLOGIST PROVIDED HISTORY: Reason for exam:->evaluate lung fields, tube placement What reading provider will be dictating this exam?->CRC FINDINGS: There has been interval placement of two left chest tubes. The left pleural effusion is significantly decreased in size. There is atelectasis in the lower left lung. No pneumothorax is seen. The heart and right lung are unchanged in appearance. Interval placement of two left chest tubes. Significantly decreased left pleural effusion. Atelectasis in the lower left lung. No visible pneumothorax. XR CHEST PORTABLE    Result Date: 1/9/2023  EXAMINATION: ONE XRAY VIEW OF THE CHEST 1/9/2023 8:20 am COMPARISON: Chest x-ray 01/08/2023 HISTORY: ORDERING SYSTEM PROVIDED HISTORY: follow up pleural effusion TECHNOLOGIST PROVIDED HISTORY: Reason for exam:->follow up pleural effusion What reading provider will be dictating this exam?->CRC FINDINGS: Cardiac size remains enlarged with opacifications at the left lung base of moderate left pleural effusion and adjacent atelectasis. No pneumothorax. Degenerate changes of the spine. Stable moderate volume left pleural effusion with adjacent atelectasis     XR CHEST PORTABLE    Result Date: 1/8/2023  EXAMINATION: ONE XRAY VIEW OF THE CHEST 1/8/2023 9:33 am COMPARISON: 01/07/2023. HISTORY: ORDERING SYSTEM PROVIDED HISTORY: post thoracentesis TECHNOLOGIST PROVIDED HISTORY: Reason for exam:->post thoracentesis What reading provider will be dictating this exam?->CRC FINDINGS: The cardiac silhouette is probably unchanged in size. The pulmonary vasculature is within normal limits. There is consolidation and/or collapse in the left lung base. There is also a left pleural effusion.   These findings have not significantly changed. Relatively stable left basilar pleural and parenchymal disease. XR CHEST PORTABLE    Result Date: 1/7/2023  EXAMINATION: ONE XRAY VIEW OF THE CHEST 1/7/2023 11:28 am COMPARISON: August 30, 2010 HISTORY: ORDERING SYSTEM PROVIDED HISTORY: chest pain, SOB TECHNOLOGIST PROVIDED HISTORY: Reason for exam:->chest pain, SOB FINDINGS: There is cardiomegaly. There is patchy infiltrates and pleural effusion in the left lower lobe concerning for pneumonia. The right lung is clear. Patchy infiltrates/atelectasis and pleural effusion in the left lung base concerning for pneumonia. Underlying malignancy has to be excluded and close surveillance recommended to see complete resolution. CTA PULMONARY W CONTRAST    Result Date: 1/7/2023  EXAMINATION: CTA OF THE CHEST 1/7/2023 1:46 pm TECHNIQUE: CTA of the chest was performed after the administration of intravenous contrast.  Multiplanar reformatted images are provided for review. MIP images are provided for review. Automated exposure control, iterative reconstruction, and/or weight based adjustment of the mA/kV was utilized to reduce the radiation dose to as low as reasonably achievable. COMPARISON: None. HISTORY: ORDERING SYSTEM PROVIDED HISTORY: chest, SOB, R/o PE TECHNOLOGIST PROVIDED HISTORY: Reason for exam:->chest, SOB, R/o PE Decision Support Exception - unselect if not a suspected or confirmed emergency medical condition->Emergency Medical Condition (MA) FINDINGS: Some motion artifacts are seen in the collapsed left lung which causes misregistration of the images in some the segment/subsegmental pulmonary arteries at that level. There is no indication for acute pulmonary embolus in the main PA, right left main PAs in the lobar, segmental and subsegmental branches to the 3/4 order. There is no aneurysm formation dissection of the thoracic aorta. Heart is normal size. LV inner diameter: 4.6 cm.  RV inner diameter: 4.3 cm.  Is no pericardial effusion. No mediastinal masses or adenopathy seen. Presence of a moderate to large size left-sided pleural effusion with significant compression atelectasis of the basal segments of the left upper but there are patent the central airways. There is some compression by the pleural effusion of the posterior aspect of the left upper lobe as the fusion extends into the left pleural fissure. Right lungs well expanded. Infiltrates or consolidations seen in the right lung. There is no right-sided pleural effusion. Upper abdominal structures not fully covered. What is visualized appear unremarkable. Degenerative changes relate with spondylosis seen in the thoracic spine. 1.  No indication for acute pulmonary emboli. 2.  No aneurysm formation or dissection of the thoracic aorta. 3.  Moderate to large size left-sided pleural effusion causes significant compression atelectasis of the left lower lobe with patent central airways. RECOMMENDATIONS: Unavailable     US RETROPERITONEAL COMPLETE    Result Date: 1/11/2023  EXAMINATION: RETROPERITONEAL ULTRASOUND OF THE KIDNEYS AND URINARY BLADDER 1/11/2023 COMPARISON: None HISTORY: ORDERING SYSTEM PROVIDED HISTORY: NEGRO TECHNOLOGIST PROVIDED HISTORY: Reason for exam:->NEGRO What reading provider will be dictating this exam?->CRC FINDINGS: Kidneys: The right kidney measures 11.7 cm in length and the left kidney measures 12.2 cm in length. Corticomedullary differentiation and cortical thickness is maintained. No renal mass, renal cysts, nor intrarenal calcifications. There is no hydronephrosis. Bladder: Bladder volume was 464 mL. No intrinsic mass, intrinsic calcification, nor wall thickening. 1.  Normal appearance of the bilateral kidneys. No hydronephrosis. 2.  Normal appearance of the imaged bladder.        Discharge Medications:      Medication List        START taking these medications      albuterol sulfate  (90 Base) MCG/ACT inhaler  Commonly known as: Proventil HFA  Inhale 2 puffs into the lungs every 6 hours as needed for Wheezing     bisacodyl 5 MG EC tablet  Commonly known as: DULCOLAX  Take 1 tablet by mouth daily as needed for Constipation     docusate sodium 100 MG capsule  Commonly known as: COLACE  Take 1 capsule by mouth 2 times daily as needed for Constipation     guaiFENesin 400 MG tablet  Take 1 tablet by mouth in the morning, at noon, and at bedtime     oxyCODONE-acetaminophen 5-325 MG per tablet  Commonly known as: Percocet  Take 1 tablet by mouth every 6 hours as needed for Pain for up to 7 days. Intended supply: 7 days. Take lowest dose possible to manage pain Max Daily Amount: 4 tablets     piperacillin-tazobactam  infusion  Commonly known as: ZOSYN  Infuse 3.375 g intravenously in the morning and 3.375 g at noon and 3.375 g in the evening. Do all this for 27 days. Compound per protocol. .            Kadi Castro taking these medications      buPROPion 100 MG tablet  Commonly known as: WELLBUTRIN     clobetasol 0.05 % ointment  Commonly known as: Temovate  Apply topically 2 times daily.      QUEtiapine 100 MG tablet  Commonly known as: SEROQUEL               Where to Get Your Medications        These medications were sent to [address] [place]  [phone]  [facility] , [address] [place] [address]      Phone: [phone]   albuterol sulfate  (90 Base) MCG/ACT inhaler  bisacodyl 5 MG EC tablet  guaiFENesin 400 MG tablet       You can get these medications from any pharmacy    Bring a paper prescription for each of these medications  docusate sodium 100 MG capsule  oxyCODONE-acetaminophen 5-325 MG per tablet  piperacillin-tazobactam  infusion         Time Spent on discharge is more than 45 minutes in the examination, evaluation, counseling and review of medications and discharge plan.    +++++++++++++++++++++++++++++++++++++++++++++++++  Mike Jey, APRN - CNP  [employer] - [facility], [place]  +++++++++++++++++++++++++++++++++++++++++++++++++  NOTE: This report was transcribed using voice recognition software. Every effort was made to ensure accuracy; however, inadvertent computerized transcription errors may be present.

## 2023-01-14 NOTE — PLAN OF CARE
Problem: Discharge Planning  Goal: Discharge to home or other facility with appropriate resources  1/14/2023 0738 by Stacie Lowry RN  Outcome: Completed  Flowsheets (Taken 1/14/2023 0730)  Discharge to home or other facility with appropriate resources: Identify barriers to discharge with patient and caregiver  1/13/2023 1954 by Stacie Lowry RN  Outcome: Progressing  Flowsheets (Taken 1/13/2023 1950)  Discharge to home or other facility with appropriate resources: Identify barriers to discharge with patient and caregiver     Problem: Pain  Goal: Verbalizes/displays adequate comfort level or baseline comfort level  1/14/2023 0738 by Stacie Lowry RN  Outcome: Completed  Flowsheets  Taken 1/14/2023 0730  Verbalizes/displays adequate comfort level or baseline comfort level: Encourage patient to monitor pain and request assistance  Taken 1/13/2023 2305  Verbalizes/displays adequate comfort level or baseline comfort level: Encourage patient to monitor pain and request assistance  1/13/2023 1954 by Stacie Lowry RN  Outcome: Progressing  Flowsheets (Taken 1/13/2023 1950)  Verbalizes/displays adequate comfort level or baseline comfort level: Encourage patient to monitor pain and request assistance     Problem: ABCDS Injury Assessment  Goal: Absence of physical injury  1/14/2023 0738 by Stacie Lowry RN  Outcome: Completed  Flowsheets (Taken 1/13/2023 2250)  Absence of Physical Injury: Implement safety measures based on patient assessment  1/13/2023 1954 by Stacie Lowry RN  Outcome: Progressing     Problem: Nutrition Deficit:  Goal: Optimize nutritional status  1/14/2023 0738 by Stacie Lowry RN  Outcome: Completed  1/13/2023 1954 by Stacie Lowry RN  Outcome: Progressing

## 2023-01-15 LAB
ANAEROBIC CULTURE: NORMAL
ANAEROBIC CULTURE: NORMAL

## 2023-01-16 LAB
ALBUMIN SERPL-MCNC: 3.4 G/DL (ref 3.5–5.2)
ALP BLD-CCNC: 138 U/L (ref 40–129)
ALT SERPL-CCNC: 99 U/L (ref 0–40)
ANION GAP SERPL CALCULATED.3IONS-SCNC: 11 MMOL/L (ref 7–16)
AST SERPL-CCNC: 34 U/L (ref 0–39)
BASOPHILS ABSOLUTE: 0.08 E9/L (ref 0–0.2)
BASOPHILS RELATIVE PERCENT: 0.8 % (ref 0–2)
BILIRUB SERPL-MCNC: <0.2 MG/DL (ref 0–1.2)
BUN BLDV-MCNC: 14 MG/DL (ref 6–20)
C-REACTIVE PROTEIN: 9.1 MG/DL (ref 0–0.4)
CALCIUM SERPL-MCNC: 8.5 MG/DL (ref 8.6–10.2)
CHLORIDE BLD-SCNC: 103 MMOL/L (ref 98–107)
CO2: 24 MMOL/L (ref 22–29)
CREAT SERPL-MCNC: 1.4 MG/DL (ref 0.7–1.2)
EOSINOPHILS ABSOLUTE: 0.41 E9/L (ref 0.05–0.5)
EOSINOPHILS RELATIVE PERCENT: 4.1 % (ref 0–6)
GFR SERPL CREATININE-BSD FRML MDRD: >60 ML/MIN/1.73
GLUCOSE BLD-MCNC: 101 MG/DL (ref 74–99)
HCT VFR BLD CALC: 32.1 % (ref 37–54)
HEMOGLOBIN: 10.1 G/DL (ref 12.5–16.5)
IMMATURE GRANULOCYTES #: 0.08 E9/L
IMMATURE GRANULOCYTES %: 0.8 % (ref 0–5)
LYMPHOCYTES ABSOLUTE: 1.38 E9/L (ref 1.5–4)
LYMPHOCYTES RELATIVE PERCENT: 13.6 % (ref 20–42)
MCH RBC QN AUTO: 27.7 PG (ref 26–35)
MCHC RBC AUTO-ENTMCNC: 31.5 % (ref 32–34.5)
MCV RBC AUTO: 88.2 FL (ref 80–99.9)
MONOCYTES ABSOLUTE: 1.1 E9/L (ref 0.1–0.95)
MONOCYTES RELATIVE PERCENT: 10.9 % (ref 2–12)
NEUTROPHILS ABSOLUTE: 7.06 E9/L (ref 1.8–7.3)
NEUTROPHILS RELATIVE PERCENT: 69.8 % (ref 43–80)
PDW BLD-RTO: 12.9 FL (ref 11.5–15)
PLATELET # BLD: 444 E9/L (ref 130–450)
PMV BLD AUTO: 10.1 FL (ref 7–12)
POTASSIUM SERPL-SCNC: 4 MMOL/L (ref 3.5–5)
RBC # BLD: 3.64 E12/L (ref 3.8–5.8)
SEDIMENTATION RATE, ERYTHROCYTE: 74 MM/HR (ref 0–15)
SODIUM BLD-SCNC: 138 MMOL/L (ref 132–146)
TOTAL PROTEIN: 6.8 G/DL (ref 6.4–8.3)
WBC # BLD: 10.1 E9/L (ref 4.5–11.5)

## 2023-01-19 LAB
ALBUMIN SERPL-MCNC: 3.5 G/DL (ref 3.5–5.2)
ALP BLD-CCNC: 123 U/L (ref 40–129)
ALT SERPL-CCNC: 50 U/L (ref 0–40)
ANION GAP SERPL CALCULATED.3IONS-SCNC: 12 MMOL/L (ref 7–16)
AST SERPL-CCNC: 21 U/L (ref 0–39)
BASOPHILS ABSOLUTE: 0.1 E9/L (ref 0–0.2)
BASOPHILS RELATIVE PERCENT: 1.1 % (ref 0–2)
BILIRUB SERPL-MCNC: <0.2 MG/DL (ref 0–1.2)
BUN BLDV-MCNC: 11 MG/DL (ref 6–20)
CALCIUM SERPL-MCNC: 9.3 MG/DL (ref 8.6–10.2)
CHLORIDE BLD-SCNC: 104 MMOL/L (ref 98–107)
CO2: 25 MMOL/L (ref 22–29)
CREAT SERPL-MCNC: 1.3 MG/DL (ref 0.7–1.2)
EOSINOPHILS ABSOLUTE: 0.22 E9/L (ref 0.05–0.5)
EOSINOPHILS RELATIVE PERCENT: 2.5 % (ref 0–6)
GFR SERPL CREATININE-BSD FRML MDRD: >60 ML/MIN/1.73
GLUCOSE BLD-MCNC: 81 MG/DL (ref 74–99)
HCT VFR BLD CALC: 31.5 % (ref 37–54)
HEMOGLOBIN: 10.1 G/DL (ref 12.5–16.5)
IMMATURE GRANULOCYTES #: 0.08 E9/L
IMMATURE GRANULOCYTES %: 0.9 % (ref 0–5)
LYMPHOCYTES ABSOLUTE: 1.36 E9/L (ref 1.5–4)
LYMPHOCYTES RELATIVE PERCENT: 15.3 % (ref 20–42)
MCH RBC QN AUTO: 27.6 PG (ref 26–35)
MCHC RBC AUTO-ENTMCNC: 32.1 % (ref 32–34.5)
MCV RBC AUTO: 86.1 FL (ref 80–99.9)
MONOCYTES ABSOLUTE: 0.89 E9/L (ref 0.1–0.95)
MONOCYTES RELATIVE PERCENT: 10 % (ref 2–12)
NEUTROPHILS ABSOLUTE: 6.21 E9/L (ref 1.8–7.3)
NEUTROPHILS RELATIVE PERCENT: 70.2 % (ref 43–80)
PDW BLD-RTO: 13.4 FL (ref 11.5–15)
PLATELET # BLD: 499 E9/L (ref 130–450)
PMV BLD AUTO: 9.2 FL (ref 7–12)
POTASSIUM SERPL-SCNC: 4 MMOL/L (ref 3.5–5)
RBC # BLD: 3.66 E12/L (ref 3.8–5.8)
SODIUM BLD-SCNC: 141 MMOL/L (ref 132–146)
TOTAL PROTEIN: 7.2 G/DL (ref 6.4–8.3)
WBC # BLD: 8.9 E9/L (ref 4.5–11.5)

## 2023-01-19 NOTE — PROGRESS NOTES
CC: Lung surgery follow up visit       HPI:  Philip Blood is a 50 y.o. male whom presents to the office today for routine post-operative follow-up status post: Left VATS, decortication on 1/10/2022 for an empyema. The patient states he has been feeling fine, however he still complains of SOB. He said today was his first big outting since surgery. He has a pulmonary follow-up appointment in place. Currently, there are no complaints of any CP, major concerns, or incisional issues. He remains on piperacillin-tazobactam 3.375 g every 8 hours until 2/7/23 per ID. Review of Systems:   Constitutional: Negative for fever and chills. Respiratory: Negative for cough, chest tightness and shortness of breath. Cardiovascular: Negative for chest pain and leg swelling. Gastrointestinal: Negative for nausea, diarrhea and constipation. Skin: Negative for color change. Neurological: Negative for dizziness, syncope and light-headedness. Objective:   Physical Exam   Constitutional: oriented to person, place, and time. No distress. Cardiovascular: Normal rate. Pulmonary/Chest: Effort normal. No respiratory distress. Chest incision(s) and chest tube incision(s) well healed without evidence of infection. Sutures intact  Abdominal: Soft. Bowel sounds are normal.   Musculoskeletal: Normal range of motion. Neurological: alert and oriented to person, place, and time. Skin: Skin is warm and dry. Psychiatric: normal mood and affect. Assessment:      S/P Left VATS, decortication      Plan:     Chest tube suture(s) removed without difficulty, patient tolerated well  Wash incisions daily with soap and water. Shower only--Do not submerge for an additional 4 weeks or until incisions completely healed. Continue follow up with PCP, Pulmonary, ID as scheduled. Encouraged to call office with any questions, concerns. Otherwise no further follow up necessary from CTS standpoint.

## 2023-01-24 ENCOUNTER — OFFICE VISIT (OUTPATIENT)
Dept: CARDIOTHORACIC SURGERY | Age: 48
End: 2023-01-24

## 2023-01-24 VITALS
SYSTOLIC BLOOD PRESSURE: 132 MMHG | HEART RATE: 90 BPM | WEIGHT: 188 LBS | BODY MASS INDEX: 27.85 KG/M2 | DIASTOLIC BLOOD PRESSURE: 101 MMHG | OXYGEN SATURATION: 96 % | HEIGHT: 69 IN

## 2023-01-24 DIAGNOSIS — J86.9 EMPYEMA (HCC): Primary | ICD-10-CM

## 2023-01-24 PROCEDURE — 99024 POSTOP FOLLOW-UP VISIT: CPT | Performed by: PHYSICIAN ASSISTANT

## 2023-01-26 LAB
ALBUMIN SERPL-MCNC: 3.7 G/DL (ref 3.5–5.2)
ALP BLD-CCNC: 125 U/L (ref 40–129)
ALT SERPL-CCNC: 23 U/L (ref 0–40)
ANION GAP SERPL CALCULATED.3IONS-SCNC: 11 MMOL/L (ref 7–16)
AST SERPL-CCNC: 20 U/L (ref 0–39)
BASOPHILS ABSOLUTE: 0.1 E9/L (ref 0–0.2)
BASOPHILS RELATIVE PERCENT: 1.6 % (ref 0–2)
BILIRUB SERPL-MCNC: <0.2 MG/DL (ref 0–1.2)
BUN BLDV-MCNC: 9 MG/DL (ref 6–20)
CALCIUM SERPL-MCNC: 8.9 MG/DL (ref 8.6–10.2)
CHLORIDE BLD-SCNC: 105 MMOL/L (ref 98–107)
CO2: 24 MMOL/L (ref 22–29)
CREAT SERPL-MCNC: 1.1 MG/DL (ref 0.7–1.2)
EOSINOPHILS ABSOLUTE: 0.26 E9/L (ref 0.05–0.5)
EOSINOPHILS RELATIVE PERCENT: 4.1 % (ref 0–6)
GFR SERPL CREATININE-BSD FRML MDRD: >60 ML/MIN/1.73
GLUCOSE BLD-MCNC: 120 MG/DL (ref 74–99)
HCT VFR BLD CALC: 32.1 % (ref 37–54)
HEMOGLOBIN: 10.9 G/DL (ref 12.5–16.5)
IMMATURE GRANULOCYTES #: 0.02 E9/L
IMMATURE GRANULOCYTES %: 0.3 % (ref 0–5)
LYMPHOCYTES ABSOLUTE: 1.23 E9/L (ref 1.5–4)
LYMPHOCYTES RELATIVE PERCENT: 19.5 % (ref 20–42)
MCH RBC QN AUTO: 27.7 PG (ref 26–35)
MCHC RBC AUTO-ENTMCNC: 34 % (ref 32–34.5)
MCV RBC AUTO: 81.5 FL (ref 80–99.9)
MONOCYTES ABSOLUTE: 0.46 E9/L (ref 0.1–0.95)
MONOCYTES RELATIVE PERCENT: 7.3 % (ref 2–12)
NEUTROPHILS ABSOLUTE: 4.24 E9/L (ref 1.8–7.3)
NEUTROPHILS RELATIVE PERCENT: 67.2 % (ref 43–80)
PDW BLD-RTO: 13.8 FL (ref 11.5–15)
PLATELET # BLD: 474 E9/L (ref 130–450)
PMV BLD AUTO: 9.6 FL (ref 7–12)
POTASSIUM SERPL-SCNC: 4.1 MMOL/L (ref 3.5–5)
RBC # BLD: 3.94 E12/L (ref 3.8–5.8)
SODIUM BLD-SCNC: 140 MMOL/L (ref 132–146)
TOTAL PROTEIN: 7.4 G/DL (ref 6.4–8.3)
WBC # BLD: 6.3 E9/L (ref 4.5–11.5)

## 2023-02-24 ENCOUNTER — HOSPITAL ENCOUNTER (OUTPATIENT)
Dept: CT IMAGING | Age: 48
End: 2023-02-24
Payer: COMMERCIAL

## 2023-02-24 DIAGNOSIS — J90 PLEURAL EFFUSION: ICD-10-CM

## 2023-02-24 DIAGNOSIS — J86.9 EMPYEMA (HCC): ICD-10-CM

## 2023-02-24 PROCEDURE — 71250 CT THORAX DX C-: CPT

## (undated) DEVICE — TROCAR: Brand: KII FIOS FIRST ENTRY

## (undated) DEVICE — DRAIN SURG SGL COLL PT TB FOR ATS BG OASIS

## (undated) DEVICE — E-Z CLEAN, NON-STICK, PTFE COATED, ELECTROSURGICAL BLADE ELECTRODE, MODIFIED EXTENDED INSULATION, 2.5 INCH (6.35 CM): Brand: MEGADYNE

## (undated) DEVICE — CONNECTOR PERF Y SHP 3/8X3/8X3/8 IN BASE EQL W/O LUER LCK

## (undated) DEVICE — WARMER SCP LAP

## (undated) DEVICE — TUBING, SUCTION, 3/16" X 12', STRAIGHT: Brand: MEDLINE

## (undated) DEVICE — GLOVE SURG SZ 65 THK91MIL LTX FREE SYN POLYISOPRENE

## (undated) DEVICE — SYRINGE MED 50ML LUERLOCK TIP

## (undated) DEVICE — 3M™ IOBAN™ 2 ANTIMICROBIAL INCISE DRAPE 6650EZ: Brand: IOBAN™ 2

## (undated) DEVICE — CATHETER THORACENTESIS STR 28 FRX23 IN 6 EYELET TAPR TIP LF

## (undated) DEVICE — DOUBLE BASIN SET: Brand: MEDLINE INDUSTRIES, INC.

## (undated) DEVICE — GLOVE ORANGE PI 7   MSG9070

## (undated) DEVICE — AGENT HEMSTAT W4XL8IN OXIDIZED REGENERATED CELOS ABSRB

## (undated) DEVICE — THORACIC: Brand: MEDLINE INDUSTRIES, INC.

## (undated) DEVICE — WOUND RETRACTOR AND PROTECTOR: Brand: ALEXIS WOUND PROTECTOR-RETRACTOR

## (undated) DEVICE — TRAP,MUCUS SPECIMEN,40CC: Brand: MEDLINE

## (undated) DEVICE — CATHETER THOR 28FR L23CM DIA9.3MM POLYVI CHL TAPR CONN TIP

## (undated) DEVICE — CLEANER,CAUTERY TIP,2X2",STERILE: Brand: MEDLINE